# Patient Record
Sex: FEMALE | Race: WHITE | NOT HISPANIC OR LATINO | Employment: FULL TIME | URBAN - METROPOLITAN AREA
[De-identification: names, ages, dates, MRNs, and addresses within clinical notes are randomized per-mention and may not be internally consistent; named-entity substitution may affect disease eponyms.]

---

## 2017-04-18 ENCOUNTER — OFFICE VISIT (OUTPATIENT)
Dept: FAMILY MEDICINE | Facility: CLINIC | Age: 57
End: 2017-04-18
Payer: COMMERCIAL

## 2017-04-18 VITALS — SYSTOLIC BLOOD PRESSURE: 128 MMHG | DIASTOLIC BLOOD PRESSURE: 84 MMHG

## 2017-04-18 DIAGNOSIS — Z00.00 ROUTINE HEALTH MAINTENANCE: Primary | ICD-10-CM

## 2017-04-18 DIAGNOSIS — Z23 NEED FOR DIPHTHERIA-TETANUS-PERTUSSIS (TDAP) VACCINE: ICD-10-CM

## 2017-04-18 DIAGNOSIS — Z23 NEED FOR TDAP VACCINATION: ICD-10-CM

## 2017-04-18 PROCEDURE — 99396 PREV VISIT EST AGE 40-64: CPT | Mod: S$GLB,,, | Performed by: FAMILY MEDICINE

## 2017-04-18 PROCEDURE — 3079F DIAST BP 80-89 MM HG: CPT | Mod: S$GLB,,, | Performed by: FAMILY MEDICINE

## 2017-04-18 PROCEDURE — 3074F SYST BP LT 130 MM HG: CPT | Mod: S$GLB,,, | Performed by: FAMILY MEDICINE

## 2017-04-18 PROCEDURE — 90471 IMMUNIZATION ADMIN: CPT | Mod: S$GLB,,, | Performed by: FAMILY MEDICINE

## 2017-04-18 PROCEDURE — 90715 TDAP VACCINE 7 YRS/> IM: CPT | Mod: S$GLB,,, | Performed by: FAMILY MEDICINE

## 2017-04-18 RX ORDER — MULTIVITAMIN
1 TABLET ORAL DAILY
COMMUNITY

## 2017-04-18 RX ORDER — METHOCARBAMOL 750 MG/1
750 TABLET, FILM COATED ORAL 3 TIMES DAILY
COMMUNITY
Start: 2017-03-07

## 2017-04-18 RX ORDER — ALPRAZOLAM 0.5 MG/1
0.5 TABLET ORAL DAILY PRN
Qty: 30 TABLET | Refills: 1 | Status: SHIPPED | OUTPATIENT
Start: 2017-04-18 | End: 2018-08-14 | Stop reason: SDUPTHER

## 2017-04-25 LAB
ALBUMIN SERPL-MCNC: 4.4 G/DL (ref 3.6–5.1)
ALBUMIN/GLOB SERPL: 1.8 (CALC) (ref 1–2.5)
ALP SERPL-CCNC: 92 U/L (ref 33–130)
ALT SERPL-CCNC: 25 U/L (ref 6–29)
AST SERPL-CCNC: 18 U/L (ref 10–35)
BILIRUB SERPL-MCNC: 0.7 MG/DL (ref 0.2–1.2)
BUN SERPL-MCNC: 17 MG/DL (ref 7–25)
BUN/CREAT SERPL: ABNORMAL (CALC) (ref 6–22)
CALCIUM SERPL-MCNC: 9.5 MG/DL (ref 8.6–10.4)
CHLORIDE SERPL-SCNC: 100 MMOL/L (ref 98–110)
CHOLEST SERPL-MCNC: 253 MG/DL (ref 125–200)
CHOLEST/HDLC SERPL: 4.1 (CALC)
CO2 SERPL-SCNC: 27 MMOL/L (ref 20–31)
CREAT SERPL-MCNC: 0.61 MG/DL (ref 0.5–1.05)
GFR SERPL CREATININE-BSD FRML MDRD: 103 ML/MIN/1.73M2
GLOBULIN SER CALC-MCNC: 2.5 G/DL (CALC) (ref 1.9–3.7)
GLUCOSE SERPL-MCNC: 114 MG/DL (ref 65–99)
HDLC SERPL-MCNC: 62 MG/DL
LDLC SERPL CALC-MCNC: 136 MG/DL (CALC)
NONHDLC SERPL-MCNC: 191 MG/DL (CALC)
POTASSIUM SERPL-SCNC: 4 MMOL/L (ref 3.5–5.3)
PROT SERPL-MCNC: 6.9 G/DL (ref 6.1–8.1)
SODIUM SERPL-SCNC: 138 MMOL/L (ref 135–146)
TRIGL SERPL-MCNC: 277 MG/DL

## 2017-04-26 ENCOUNTER — TELEPHONE (OUTPATIENT)
Dept: FAMILY MEDICINE | Facility: CLINIC | Age: 57
End: 2017-04-26

## 2017-04-26 DIAGNOSIS — R73.9 HYPERGLYCEMIA: ICD-10-CM

## 2017-04-26 DIAGNOSIS — E78.2 ELEVATED TRIGLYCERIDES WITH HIGH CHOLESTEROL: Primary | ICD-10-CM

## 2017-04-28 NOTE — PROGRESS NOTES
Patient ID: Gabby Dill is a 56 y.o. female.    Chief Complaint: Follow-up (annual checkup) and Cyst    HPI      Gabby Dill is a 56 y.o. female. here for annual exam.   No current complaints except cyst on labial area which she is going to see GYN.  When we discussed what the lesion most likely is ( bartholin cyst)  Would rather GYN take care of it.  Htn stable  Occ muscle ache spasm  Anxiety occ use xanax.          Review of Symptoms    Constitutional: Negative.    HENT: Negative.    Eyes: Negative.    Respiratory: Negative.    Cardiovascular: Negative.    Gastrointestinal: Negative.    Endocrine: Negative.    Genitourinary: Negative.    Musculoskeletal: Negative.    Skin: Negative.    Allergic/Immunologic: Negative.    Neurological: Negative.    Hematological: Negative.    Psychiatric/Behavioral: Negative.      Except as above in HPI        Physical  Exam    Constitutional:  Oriented to person, place, and time. Appears well-developed and well-nourished.     HENT:   Head: Normocephalic and atraumatic.     Right Ear: Tympanic membrane, external ear and ear canal normal.     Left Ear: Tympanic membrane, external ear and ear canal normal.     Nose: Nose normal. No rhinorrhea or nasal deformity.     Mouth/Throat: Uvula is midline, oropharynx is clear and moist and mucous membranes are normal.       Eyes: Conjunctivae are normal. Right eye exhibits no discharge. Left eye exhibits no discharge. No scleral icterus.     Neck:  No JVD present. No tracheal deviation  [x]  Neck supple.   []  No Carotid bruit    Cardiovascular: Normal rate, regular rhythm and normal heart sounds.      Pulmonary/Chest: Effort normal and breath sounds normal. No stridor. No respiratory distress. No wheezes. No rales.      Musculoskeletal: Normal range of motion. No edema or tenderness.   No deformity     Lymphadenopathy:  No cervical adenopathy.     Neurological:  Alert and oriented to person, place, and time. Coordination  normal.     Skin: Skin is warm and dry. No rash noted.     Psychiatric: Normal mood and affect. Speech is normal and behavior is normal. Judgment and thought content normal.       Assessment / Plan:      ICD-10-CM ICD-9-CM   1. Routine health maintenance Z00.00 V70.0   2. Need for diphtheria-tetanus-pertussis (Tdap) vaccine Z23 V06.1   3. Need for Tdap vaccination Z23 V06.1     Routine health maintenance  -     Comprehensive metabolic panel; Future  -     Lipid panel; Future    Need for diphtheria-tetanus-pertussis (Tdap) vaccine  -     Tdap Vaccine    Need for Tdap vaccination  -     Cancel: Tdap Vaccine    Other orders  -     alprazolam (XANAX) 0.5 MG tablet; Take 1 tablet (0.5 mg total) by mouth daily as needed for Anxiety.  Dispense: 30 tablet; Refill: 1    Discussed how to stay healthy including: diet, exercise, refraining from smoking and discussed screening exams / tests needed for age, sex and family Hx.

## 2017-07-10 RX ORDER — LISINOPRIL AND HYDROCHLOROTHIAZIDE 12.5; 2 MG/1; MG/1
1 TABLET ORAL DAILY
Qty: 90 TABLET | Refills: 3 | Status: SHIPPED | OUTPATIENT
Start: 2017-07-10 | End: 2018-04-17 | Stop reason: SDUPTHER

## 2017-07-10 NOTE — TELEPHONE ENCOUNTER
Please send to [facility]. Last OV 04/18/2017    lisinopril-hydrochlorothiazide (PRINZIDE,ZESTORETIC) 20-12.5 mg per tablet  Take 1 tablet by mouth once daily.   Normal, Disp-90 tablet, R-3

## 2017-08-02 DIAGNOSIS — Z12.31 SCREENING MAMMOGRAM, ENCOUNTER FOR: Primary | ICD-10-CM

## 2017-10-24 ENCOUNTER — HOSPITAL ENCOUNTER (OUTPATIENT)
Dept: RADIOLOGY | Facility: HOSPITAL | Age: 57
Discharge: HOME | End: 2017-10-24
Attending: OBSTETRICS & GYNECOLOGY
Payer: COMMERCIAL

## 2017-10-24 VITALS — HEIGHT: 64 IN | BODY MASS INDEX: 30.9 KG/M2 | WEIGHT: 181 LBS

## 2017-10-24 DIAGNOSIS — Z12.31 SCREENING MAMMOGRAM, ENCOUNTER FOR: ICD-10-CM

## 2017-10-24 PROCEDURE — 77067 SCR MAMMO BI INCL CAD: CPT | Mod: TC

## 2017-11-13 ENCOUNTER — TELEPHONE (OUTPATIENT)
Dept: FAMILY MEDICINE | Facility: CLINIC | Age: 57
End: 2017-11-13

## 2017-11-13 RX ORDER — CEPHALEXIN 500 MG/1
1000 CAPSULE ORAL 2 TIMES DAILY
Qty: 28 CAPSULE | Refills: 0 | Status: SHIPPED | OUTPATIENT
Start: 2017-11-13 | End: 2018-08-14

## 2017-11-13 NOTE — TELEPHONE ENCOUNTER
I left message with details for the pt that [facility] and advised warm compresses and advised to return call with any questions

## 2017-11-13 NOTE — TELEPHONE ENCOUNTER
----- Message from Elida Combs sent at 11/13/2017  9:52 AM CST -----  Patient has a pimple buttock next to anus. Patient used warm compress & it bust. Doesn't have a head to hit. She is back to regular return. Swelling has gone down considerably since Saturday. Patient is requesting an antibiotic just as a precaution. (allergic to inseds)     [facility]

## 2018-04-17 RX ORDER — LISINOPRIL AND HYDROCHLOROTHIAZIDE 12.5; 2 MG/1; MG/1
1 TABLET ORAL DAILY
Qty: 90 TABLET | Refills: 0 | Status: SHIPPED | OUTPATIENT
Start: 2018-04-17 | End: 2018-09-02 | Stop reason: SDUPTHER

## 2018-04-17 NOTE — TELEPHONE ENCOUNTER
Please send to [facility]    Remove  lisinopril-hydrochlorothiazide (PRINZIDE,ZESTORETIC) 20-12.5 mg per tablet  Take 1 tablet by mouth once daily.   Normal, Disp-90 tablet, R-3

## 2018-08-09 ENCOUNTER — TELEPHONE (OUTPATIENT)
Dept: FAMILY MEDICINE | Facility: CLINIC | Age: 58
End: 2018-08-09

## 2018-08-09 DIAGNOSIS — E78.2 ELEVATED TRIGLYCERIDES WITH HIGH CHOLESTEROL: ICD-10-CM

## 2018-08-09 DIAGNOSIS — Z00.00 ROUTINE HEALTH MAINTENANCE: Primary | ICD-10-CM

## 2018-08-09 DIAGNOSIS — K21.00 REFLUX ESOPHAGITIS: ICD-10-CM

## 2018-08-09 DIAGNOSIS — R73.9 HYPERGLYCEMIA: ICD-10-CM

## 2018-08-09 DIAGNOSIS — I10 ESSENTIAL HYPERTENSION: ICD-10-CM

## 2018-08-09 NOTE — TELEPHONE ENCOUNTER
----- Message from Yessenia Banuelos sent at 8/9/2018  9:12 AM CDT -----  Contact: [phone]/self  Patient is calling to find out if she and her  needs to have blood work done before their appt on 8/14/18. Please call and advise.    Dr Galdamez - please review pt labs - she has orders already bmp, a1c, lipid  To be drawn at [facility]  But do you want any other labs drawn  ( if so we need to cancel the already ordered test and order them all together that way the original orders are not missed )

## 2018-08-09 NOTE — TELEPHONE ENCOUNTER
----- Message from Che Oleary sent at 8/9/2018  3:45 PM CDT -----  Contact: self / [phone]  Attn: Mel Farr & Dolly Balderas         Patient is requesting a call back regarding, she needs lab orders. Please advise

## 2018-08-11 LAB
ALBUMIN SERPL-MCNC: 4.3 G/DL (ref 3.6–5.1)
ALBUMIN/GLOB SERPL: 1.7 (CALC) (ref 1–2.5)
ALP SERPL-CCNC: 43 U/L (ref 33–130)
ALT SERPL-CCNC: 16 U/L (ref 6–29)
AST SERPL-CCNC: 16 U/L (ref 10–35)
BILIRUB DIRECT SERPL-MCNC: 0.1 MG/DL
BILIRUB INDIRECT SERPL-MCNC: 0.6 MG/DL (CALC) (ref 0.2–1.2)
BILIRUB SERPL-MCNC: 0.7 MG/DL (ref 0.2–1.2)
GLOBULIN SER CALC-MCNC: 2.6 G/DL (CALC) (ref 1.9–3.7)
PROT SERPL-MCNC: 6.9 G/DL (ref 6.1–8.1)
TSH SERPL-ACNC: 1.71 MIU/L (ref 0.4–4.5)

## 2018-08-14 ENCOUNTER — OFFICE VISIT (OUTPATIENT)
Dept: FAMILY MEDICINE | Facility: CLINIC | Age: 58
End: 2018-08-14
Payer: COMMERCIAL

## 2018-08-14 VITALS
HEIGHT: 64 IN | HEART RATE: 99 BPM | SYSTOLIC BLOOD PRESSURE: 128 MMHG | BODY MASS INDEX: 30.56 KG/M2 | TEMPERATURE: 99 F | WEIGHT: 179 LBS | DIASTOLIC BLOOD PRESSURE: 78 MMHG | OXYGEN SATURATION: 98 %

## 2018-08-14 DIAGNOSIS — Z00.00 ROUTINE HEALTH MAINTENANCE: Primary | ICD-10-CM

## 2018-08-14 DIAGNOSIS — E78.2 ELEVATED TRIGLYCERIDES WITH HIGH CHOLESTEROL: ICD-10-CM

## 2018-08-14 DIAGNOSIS — R73.9 HYPERGLYCEMIA: ICD-10-CM

## 2018-08-14 LAB
BUN SERPL-MCNC: 20 MG/DL (ref 7–25)
BUN/CREAT SERPL: ABNORMAL (CALC) (ref 6–22)
CALCIUM SERPL-MCNC: 9.5 MG/DL (ref 8.6–10.4)
CHLORIDE SERPL-SCNC: 102 MMOL/L (ref 98–110)
CHOLEST SERPL-MCNC: 240 MG/DL
CHOLEST/HDLC SERPL: 3.3 (CALC)
CO2 SERPL-SCNC: 28 MMOL/L (ref 20–32)
CREAT SERPL-MCNC: 0.68 MG/DL (ref 0.5–1.05)
GFR SERPL CREATININE-BSD FRML MDRD: 98 ML/MIN/1.73M2
GLUCOSE SERPL-MCNC: 130 MG/DL (ref 65–99)
HBA1C MFR BLD: 5.7 % OF TOTAL HGB
HDLC SERPL-MCNC: 73 MG/DL
LDLC SERPL CALC-MCNC: 127 MG/DL (CALC)
NONHDLC SERPL-MCNC: 167 MG/DL (CALC)
POTASSIUM SERPL-SCNC: 4.4 MMOL/L (ref 3.5–5.3)
SODIUM SERPL-SCNC: 140 MMOL/L (ref 135–146)
TRIGL SERPL-MCNC: 258 MG/DL

## 2018-08-14 PROCEDURE — 3078F DIAST BP <80 MM HG: CPT | Mod: CPTII,S$GLB,, | Performed by: FAMILY MEDICINE

## 2018-08-14 PROCEDURE — 99396 PREV VISIT EST AGE 40-64: CPT | Mod: S$GLB,,, | Performed by: FAMILY MEDICINE

## 2018-08-14 PROCEDURE — 3074F SYST BP LT 130 MM HG: CPT | Mod: CPTII,S$GLB,, | Performed by: FAMILY MEDICINE

## 2018-08-14 RX ORDER — ESTRADIOL 1 MG/1
1 TABLET ORAL DAILY
COMMUNITY
Start: 2018-06-23

## 2018-08-14 RX ORDER — PROGESTERONE 100 MG/1
1 CAPSULE ORAL DAILY
COMMUNITY
Start: 2018-06-26

## 2018-08-14 RX ORDER — ALPRAZOLAM 0.5 MG/1
0.5 TABLET ORAL DAILY PRN
Qty: 30 TABLET | Refills: 1 | Status: SHIPPED | OUTPATIENT
Start: 2018-08-14 | End: 2019-02-26 | Stop reason: SDUPTHER

## 2018-08-14 NOTE — PROGRESS NOTES
" Patient ID: Gabby Dill is a 58 y.o. female.    Chief Complaint: Annual Exam    HPI      Gabby Dill is a 58 y.o. female. here for annual exam.   No current complaints except some mild nasal congestion and postnasal drip.  Also itchy watery eyes and sneezing.        Review of Symptoms    Constitutional: Negative.    HENT: Negative.    Eyes: Negative.    Respiratory: Negative.    Cardiovascular: Negative.    Gastrointestinal: Negative.    Endocrine: Negative.    Genitourinary: Negative.    Musculoskeletal: Negative.    Skin: Negative.    Allergic/Immunologic: Negative.    Neurological: Negative.    Hematological: Negative.    Psychiatric/Behavioral: Negative.      Except as above in HPI      /78   Pulse 99   Temp 98.5 °F (36.9 °C)   Ht 5' 4" (1.626 m)   Wt 81.2 kg (179 lb)   SpO2 98%   BMI 30.73 kg/m²     Physical  Exam    Constitutional:  Oriented to person, place, and time. Appears well-developed and well-nourished.     HENT:   Head: Normocephalic and atraumatic.     Right Ear: Tympanic membrane, ear canal and External ear normal     Left Ear: Tympanic membrane, ear canal and External ear normal     Nose: Nose normal.  Minimal rhinorrhea and no nasal deformity - right nares- slight edema of turbinates.     Mouth/Throat: Uvula is midline, oropharynx is clear and moist and mucous membranes are normal.      Eyes: Conjunctivae are normal. Right eye exhibits no discharge. Left eye exhibits no discharge. No scleral icterus.     Neck:  No JVD present. No tracheal deviation  [x]  Neck supple.   [x]  No Carotid bruit    Cardiovascular: Normal rate, regular rhythm and normal heart sounds.      Pulmonary/Chest: Effort normal and breath sounds normal. No stridor. No respiratory distress. No wheezes. No rales.      Musculoskeletal: Normal range of motion. No edema or tenderness.   No deformity     Lymphadenopathy:  No cervical adenopathy.     Neurological:  Alert and oriented to person, place, and " time. Coordination normal.     Skin: Skin is warm and dry. No rash noted.     Psychiatric: Normal mood and affect. Speech is normal and behavior is normal. Judgment and thought content normal.     Complete Blood Count  Lab Results   Component Value Date    RBC 5.06 11/08/2016    HGB 14.2 11/08/2016    HCT 42.7 11/08/2016    MCV 84.4 11/08/2016    MCH 28.1 11/08/2016    MCHC 33.3 11/08/2016    RDW 14.1 11/08/2016     11/08/2016    MPV 8.0 11/08/2016    LYMPH 2,323 11/08/2016    LYMPH 36.3 11/08/2016    MONO 653 11/08/2016    MONO 10.2 11/08/2016     11/08/2016    BASO 19 11/08/2016    EOSINOPHIL 2.0 11/08/2016    BASOPHIL 0.3 11/08/2016       Comprehensive Metabolic Panel  Lab Results   Component Value Date    PROT 6.9 08/10/2018    ALBUMIN 4.3 08/10/2018    BILITOT 0.7 08/10/2018    AST 16 08/10/2018    ALKPHOS 43 08/10/2018    ALT 16 08/10/2018       TSH  Lab Results   Component Value Date    TSH 1.71 08/10/2018       Assessment / Plan:      ICD-10-CM ICD-9-CM   1. Routine health maintenance Z00.00 V70.0   2. Elevated triglycerides with high cholesterol E78.2 272.2   3. Hyperglycemia R73.9 790.29     Routine health maintenance  -     Basic metabolic panel; Future; Expected date: 08/14/2018  -     Lipid panel; Future  -     Hemoglobin A1c; Future    Elevated triglycerides with high cholesterol  -     Basic metabolic panel; Future; Expected date: 08/14/2018  -     Lipid panel; Future  -     Hemoglobin A1c; Future    Hyperglycemia  -     Basic metabolic panel; Future; Expected date: 08/14/2018  -     Lipid panel; Future  -     Hemoglobin A1c; Future    Other orders  -     ALPRAZolam (XANAX) 0.5 MG tablet; Take 1 tablet (0.5 mg total) by mouth daily as needed for Anxiety.  Dispense: 30 tablet; Refill: 1      Discussed lab work-other lab work basic metabolic panel, hemoglobin A1c, lipid panel not done    Discussed how to stay healthy including: diet, exercise, refraining from smoking and discussed  screening exams / tests needed for age, sex and family Hx.

## 2018-09-03 RX ORDER — LISINOPRIL AND HYDROCHLOROTHIAZIDE 12.5; 2 MG/1; MG/1
TABLET ORAL
Qty: 90 TABLET | Refills: 0 | Status: SHIPPED | OUTPATIENT
Start: 2018-09-03 | End: 2019-01-01 | Stop reason: SDUPTHER

## 2018-09-05 ENCOUNTER — TELEPHONE (OUTPATIENT)
Dept: FAMILY MEDICINE | Facility: CLINIC | Age: 58
End: 2018-09-05

## 2018-09-05 DIAGNOSIS — Z12.31 ENCOUNTER FOR SCREENING MAMMOGRAM FOR BREAST CANCER: ICD-10-CM

## 2018-09-05 DIAGNOSIS — Z12.39 SCREENING FOR BREAST CANCER: Primary | ICD-10-CM

## 2018-09-05 NOTE — TELEPHONE ENCOUNTER
----- Message from Imani Morris sent at 9/5/2018  9:40 AM CDT -----  Contact: [phone]/self  Patient requesting orders for a mammogram. Thanks        Not due until after 10/24

## 2018-11-16 ENCOUNTER — HOSPITAL ENCOUNTER (OUTPATIENT)
Dept: RADIOLOGY | Facility: HOSPITAL | Age: 58
Discharge: HOME | End: 2018-11-16
Attending: FAMILY MEDICINE
Payer: COMMERCIAL

## 2018-11-16 VITALS — BODY MASS INDEX: 30.56 KG/M2 | HEIGHT: 64 IN | WEIGHT: 179 LBS

## 2018-11-16 DIAGNOSIS — Z12.31 ENCOUNTER FOR SCREENING MAMMOGRAM FOR BREAST CANCER: ICD-10-CM

## 2018-11-16 DIAGNOSIS — Z12.39 SCREENING FOR BREAST CANCER: ICD-10-CM

## 2018-11-16 PROCEDURE — 77063 BREAST TOMOSYNTHESIS BI: CPT | Mod: TC,PO

## 2018-11-16 PROCEDURE — 77067 SCR MAMMO BI INCL CAD: CPT | Mod: TC,PO

## 2019-01-02 RX ORDER — LISINOPRIL AND HYDROCHLOROTHIAZIDE 12.5; 2 MG/1; MG/1
TABLET ORAL
Qty: 90 TABLET | Refills: 0 | Status: SHIPPED | OUTPATIENT
Start: 2019-01-02 | End: 2019-04-02 | Stop reason: SDUPTHER

## 2019-01-07 ENCOUNTER — TELEPHONE (OUTPATIENT)
Dept: FAMILY MEDICINE | Facility: CLINIC | Age: 59
End: 2019-01-07

## 2019-01-07 NOTE — TELEPHONE ENCOUNTER
----- Message from Imani Morris sent at 1/7/2019  3:19 PM CST -----  Contact: [phone]/self  Patient is requesting a call about test results. Dr. Benito should have faxed her results showing kidney stone. He suggest she follow up with PCP. Thanks    Did you receive this report? AR

## 2019-01-08 NOTE — TELEPHONE ENCOUNTER
I notified the pt - we have not received the report  I called dr geo [facility], [place] - she will fax report and then I will forward this to dr henriquez to review once I receive the report

## 2019-01-08 NOTE — TELEPHONE ENCOUNTER
The report shows a small stone in the collecting area of the kidney.  This should cause no problems.  The there is nothing to do except observe

## 2019-01-09 NOTE — TELEPHONE ENCOUNTER
That stone is NOT NOT the problem lots of people have them.  Most are from calcium but we have no way to do analysis on  it.  I would just stay well hydrated.

## 2019-01-09 NOTE — TELEPHONE ENCOUNTER
I spoke with the pt - she said she had the CT scan was bc she was having pain/discomfort in that particular area  She thought it was diverticulitis again  She is not bend over in pain but tender and sore discomfort      She had a Colonoscopy at end of nov 2018  Dr Benito said her colitis looked good  He advised her to   Stop Norco and he started  Her taking alleve once daily for the arthritis pain    The main concern is - should you afford anything that would cause her to have this stone grow    I suggested she come in and maybe discuss with you - she just wants your opinion - unfortunately can you call her to reassure her that all is good?

## 2019-01-09 NOTE — TELEPHONE ENCOUNTER
Patient advised of results. Patient would like to know if there's any foods or other things she should be avoiding to not aggravate the issue. Because she initially went to see her gastro doctor due to pain; she's fine right now but doesn't want to do anything that would cause it to be unbearable.

## 2019-02-26 RX ORDER — ALPRAZOLAM 0.5 MG/1
TABLET ORAL
Qty: 30 TABLET | Refills: 1 | Status: SHIPPED | OUTPATIENT
Start: 2019-02-26 | End: 2019-10-29 | Stop reason: SDUPTHER

## 2019-04-02 RX ORDER — LISINOPRIL AND HYDROCHLOROTHIAZIDE 12.5; 2 MG/1; MG/1
TABLET ORAL
Qty: 90 TABLET | Refills: 0 | Status: SHIPPED | OUTPATIENT
Start: 2019-04-02 | End: 2019-07-07 | Stop reason: SDUPTHER

## 2019-07-07 RX ORDER — LISINOPRIL AND HYDROCHLOROTHIAZIDE 12.5; 2 MG/1; MG/1
TABLET ORAL
Qty: 90 TABLET | Refills: 0 | Status: SHIPPED | OUTPATIENT
Start: 2019-07-07 | End: 2019-10-08 | Stop reason: SDUPTHER

## 2019-09-30 ENCOUNTER — HOSPITAL ENCOUNTER (OUTPATIENT)
Facility: HOSPITAL | Age: 59
Discharge: HOME | End: 2019-10-01
Attending: EMERGENCY MEDICINE | Admitting: SURGERY
Payer: COMMERCIAL

## 2019-09-30 ENCOUNTER — ANESTHESIA EVENT (OUTPATIENT)
Dept: SURGERY | Facility: HOSPITAL | Age: 59
End: 2019-09-30
Payer: COMMERCIAL

## 2019-09-30 DIAGNOSIS — K35.30 ACUTE APPENDICITIS WITH LOCALIZED PERITONITIS, WITHOUT PERFORATION, ABSCESS, OR GANGRENE: Primary | ICD-10-CM

## 2019-09-30 LAB
ALBUMIN SERPL BCP-MCNC: 4 G/DL (ref 3.5–5.2)
ALP SERPL-CCNC: 49 U/L (ref 38–126)
ALT SERPL W/O P-5'-P-CCNC: 22 U/L (ref 10–44)
ANION GAP SERPL CALC-SCNC: 9 MMOL/L (ref 8–16)
AST SERPL-CCNC: 25 U/L (ref 15–46)
BASOPHILS # BLD AUTO: 0.01 K/UL (ref 0–0.2)
BASOPHILS NFR BLD: 0.1 % (ref 0–1.9)
BILIRUB SERPL-MCNC: 0.8 MG/DL (ref 0.1–1)
BILIRUB UR QL STRIP: NEGATIVE
BUN SERPL-MCNC: 26 MG/DL (ref 7–17)
CALCIUM SERPL-MCNC: 9 MG/DL (ref 8.7–10.5)
CHLORIDE SERPL-SCNC: 107 MMOL/L (ref 95–110)
CLARITY UR REFRACT.AUTO: CLEAR
CO2 SERPL-SCNC: 23 MMOL/L (ref 23–29)
COLOR UR AUTO: ABNORMAL
CREAT SERPL-MCNC: 0.53 MG/DL (ref 0.5–1.4)
DIFFERENTIAL METHOD: ABNORMAL
EOSINOPHIL # BLD AUTO: 0.1 K/UL (ref 0–0.5)
EOSINOPHIL NFR BLD: 0.7 % (ref 0–8)
ERYTHROCYTE [DISTWIDTH] IN BLOOD BY AUTOMATED COUNT: 12.9 % (ref 11.5–14.5)
EST. GFR  (AFRICAN AMERICAN): >60 ML/MIN/1.73 M^2
EST. GFR  (NON AFRICAN AMERICAN): >60 ML/MIN/1.73 M^2
GLUCOSE SERPL-MCNC: 129 MG/DL (ref 70–110)
GLUCOSE UR QL STRIP: NEGATIVE
HCT VFR BLD AUTO: 42.6 % (ref 37–48.5)
HGB BLD-MCNC: 14.3 G/DL (ref 12–16)
HGB UR QL STRIP: ABNORMAL
KETONES UR QL STRIP: NEGATIVE
LEUKOCYTE ESTERASE UR QL STRIP: NEGATIVE
LIPASE SERPL-CCNC: 40 U/L (ref 23–300)
LYMPHOCYTES # BLD AUTO: 1 K/UL (ref 1–4.8)
LYMPHOCYTES NFR BLD: 14 % (ref 18–48)
MCH RBC QN AUTO: 29.7 PG (ref 27–31)
MCHC RBC AUTO-ENTMCNC: 33.6 G/DL (ref 32–36)
MCV RBC AUTO: 89 FL (ref 82–98)
MONOCYTES # BLD AUTO: 0.1 K/UL (ref 0.3–1)
MONOCYTES NFR BLD: 1.3 % (ref 4–15)
NEUTROPHILS # BLD AUTO: 5.7 K/UL (ref 1.8–7.7)
NEUTROPHILS NFR BLD: 83.9 % (ref 38–73)
NITRITE UR QL STRIP: NEGATIVE
PH UR STRIP: 5 [PH] (ref 5–8)
PLATELET # BLD AUTO: 242 K/UL (ref 150–350)
PMV BLD AUTO: 9.8 FL (ref 9.2–12.9)
POTASSIUM SERPL-SCNC: 3.7 MMOL/L (ref 3.5–5.1)
PROT SERPL-MCNC: 6.7 G/DL (ref 6–8.4)
PROT UR QL STRIP: NEGATIVE
RBC # BLD AUTO: 4.81 M/UL (ref 4–5.4)
SODIUM SERPL-SCNC: 139 MMOL/L (ref 136–145)
SP GR UR STRIP: 1.01 (ref 1–1.03)
URN SPEC COLLECT METH UR: ABNORMAL
UROBILINOGEN UR STRIP-ACNC: NEGATIVE EU/DL
WBC # BLD AUTO: 6.86 K/UL (ref 3.9–12.7)

## 2019-09-30 PROCEDURE — G0378 HOSPITAL OBSERVATION PER HR: HCPCS

## 2019-09-30 PROCEDURE — 81003 URINALYSIS AUTO W/O SCOPE: CPT | Mod: ER

## 2019-09-30 PROCEDURE — 83690 ASSAY OF LIPASE: CPT | Mod: ER

## 2019-09-30 PROCEDURE — 96375 TX/PRO/DX INJ NEW DRUG ADDON: CPT | Mod: ER

## 2019-09-30 PROCEDURE — 85025 COMPLETE CBC W/AUTO DIFF WBC: CPT | Mod: ER

## 2019-09-30 PROCEDURE — 25000003 PHARM REV CODE 250: Performed by: SURGERY

## 2019-09-30 PROCEDURE — 80053 COMPREHEN METABOLIC PANEL: CPT | Mod: ER

## 2019-09-30 PROCEDURE — 96375 TX/PRO/DX INJ NEW DRUG ADDON: CPT

## 2019-09-30 PROCEDURE — 96365 THER/PROPH/DIAG IV INF INIT: CPT

## 2019-09-30 PROCEDURE — 63600175 PHARM REV CODE 636 W HCPCS: Performed by: SURGERY

## 2019-09-30 PROCEDURE — 63600175 PHARM REV CODE 636 W HCPCS: Mod: ER | Performed by: EMERGENCY MEDICINE

## 2019-09-30 PROCEDURE — 96361 HYDRATE IV INFUSION ADD-ON: CPT | Mod: ER

## 2019-09-30 PROCEDURE — 25000003 PHARM REV CODE 250: Performed by: STUDENT IN AN ORGANIZED HEALTH CARE EDUCATION/TRAINING PROGRAM

## 2019-09-30 PROCEDURE — 99285 EMERGENCY DEPT VISIT HI MDM: CPT | Mod: 25,ER

## 2019-09-30 PROCEDURE — 96361 HYDRATE IV INFUSION ADD-ON: CPT

## 2019-09-30 RX ORDER — OXYCODONE HYDROCHLORIDE 5 MG/1
5 TABLET ORAL EVERY 4 HOURS PRN
Status: DISCONTINUED | OUTPATIENT
Start: 2019-09-30 | End: 2019-10-01 | Stop reason: HOSPADM

## 2019-09-30 RX ORDER — KETOROLAC TROMETHAMINE 30 MG/ML
15 INJECTION, SOLUTION INTRAMUSCULAR; INTRAVENOUS EVERY 6 HOURS
Status: DISCONTINUED | OUTPATIENT
Start: 2019-09-30 | End: 2019-09-30

## 2019-09-30 RX ORDER — ACETAMINOPHEN 325 MG/1
650 TABLET ORAL EVERY 4 HOURS PRN
Status: DISCONTINUED | OUTPATIENT
Start: 2019-09-30 | End: 2019-10-01 | Stop reason: HOSPADM

## 2019-09-30 RX ORDER — SODIUM CHLORIDE, SODIUM LACTATE, POTASSIUM CHLORIDE, CALCIUM CHLORIDE 600; 310; 30; 20 MG/100ML; MG/100ML; MG/100ML; MG/100ML
INJECTION, SOLUTION INTRAVENOUS CONTINUOUS
Status: DISCONTINUED | OUTPATIENT
Start: 2019-09-30 | End: 2019-10-01 | Stop reason: HOSPADM

## 2019-09-30 RX ORDER — SODIUM CHLORIDE 0.9 % (FLUSH) 0.9 %
10 SYRINGE (ML) INJECTION
Status: DISCONTINUED | OUTPATIENT
Start: 2019-09-30 | End: 2019-09-30

## 2019-09-30 RX ORDER — SODIUM CHLORIDE 0.9 % (FLUSH) 0.9 %
10 SYRINGE (ML) INJECTION
Status: DISCONTINUED | OUTPATIENT
Start: 2019-09-30 | End: 2019-10-01 | Stop reason: HOSPADM

## 2019-09-30 RX ORDER — ONDANSETRON 8 MG/1
8 TABLET, ORALLY DISINTEGRATING ORAL EVERY 8 HOURS PRN
Status: DISCONTINUED | OUTPATIENT
Start: 2019-09-30 | End: 2019-10-01 | Stop reason: HOSPADM

## 2019-09-30 RX ORDER — BUTALBITAL, ACETAMINOPHEN AND CAFFEINE 50; 325; 40 MG/1; MG/1; MG/1
1 TABLET ORAL ONCE
Status: COMPLETED | OUTPATIENT
Start: 2019-09-30 | End: 2019-09-30

## 2019-09-30 RX ORDER — ACETAMINOPHEN 325 MG/1
650 TABLET ORAL EVERY 8 HOURS PRN
Status: DISCONTINUED | OUTPATIENT
Start: 2019-09-30 | End: 2019-10-01 | Stop reason: HOSPADM

## 2019-09-30 RX ORDER — ALPRAZOLAM 0.25 MG/1
0.5 TABLET ORAL NIGHTLY PRN
Status: DISCONTINUED | OUTPATIENT
Start: 2019-09-30 | End: 2019-10-01 | Stop reason: HOSPADM

## 2019-09-30 RX ORDER — ONDANSETRON 2 MG/ML
4 INJECTION INTRAMUSCULAR; INTRAVENOUS
Status: COMPLETED | OUTPATIENT
Start: 2019-09-30 | End: 2019-09-30

## 2019-09-30 RX ADMIN — OXYCODONE HYDROCHLORIDE 5 MG: 5 TABLET ORAL at 09:09

## 2019-09-30 RX ADMIN — SODIUM CHLORIDE, SODIUM LACTATE, POTASSIUM CHLORIDE, AND CALCIUM CHLORIDE: .6; .31; .03; .02 INJECTION, SOLUTION INTRAVENOUS at 06:09

## 2019-09-30 RX ADMIN — ACETAMINOPHEN 650 MG: 325 TABLET ORAL at 09:09

## 2019-09-30 RX ADMIN — OXYCODONE HYDROCHLORIDE 5 MG: 5 TABLET ORAL at 04:09

## 2019-09-30 RX ADMIN — PIPERACILLIN AND TAZOBACTAM 4.5 G: 4; .5 INJECTION, POWDER, LYOPHILIZED, FOR SOLUTION INTRAVENOUS; PARENTERAL at 08:09

## 2019-09-30 RX ADMIN — SODIUM CHLORIDE, SODIUM LACTATE, POTASSIUM CHLORIDE, AND CALCIUM CHLORIDE: .6; .31; .03; .02 INJECTION, SOLUTION INTRAVENOUS at 09:09

## 2019-09-30 RX ADMIN — SODIUM CHLORIDE 1000 ML: 0.9 INJECTION, SOLUTION INTRAVENOUS at 03:09

## 2019-09-30 RX ADMIN — ONDANSETRON 8 MG: 8 TABLET, ORALLY DISINTEGRATING ORAL at 09:09

## 2019-09-30 RX ADMIN — PIPERACILLIN AND TAZOBACTAM 4.5 G: 4; .5 INJECTION, POWDER, LYOPHILIZED, FOR SOLUTION INTRAVENOUS; PARENTERAL at 05:09

## 2019-09-30 RX ADMIN — PIPERACILLIN AND TAZOBACTAM 4.5 G: 4; .5 INJECTION, POWDER, LYOPHILIZED, FOR SOLUTION INTRAVENOUS; PARENTERAL at 12:09

## 2019-09-30 RX ADMIN — BUTALBITAL, ACETAMINOPHEN, AND CAFFEINE 1 TABLET: 50; 325; 40 TABLET ORAL at 12:09

## 2019-09-30 RX ADMIN — ONDANSETRON 4 MG: 2 INJECTION INTRAMUSCULAR; INTRAVENOUS at 03:09

## 2019-09-30 RX ADMIN — ONDANSETRON 8 MG: 8 TABLET, ORALLY DISINTEGRATING ORAL at 06:09

## 2019-09-30 RX ADMIN — PROMETHAZINE HYDROCHLORIDE 12.5 MG: 25 INJECTION INTRAMUSCULAR; INTRAVENOUS at 12:09

## 2019-09-30 NOTE — ED NOTES
Updated pt and family that report has been called and we are just waiting on [facility] to transport. Discussed things pt would need at next hospital. I strongly advised the they bring all pt's medication bottles so that the next facility could perform a more accurate med rec.  We discussed the need for robe, slippers, and loose fitting clothing.   Pt and spouse verbalized understanding.

## 2019-09-30 NOTE — PLAN OF CARE
Patient is sleeping on and off in bed.  Has complaints of pain and pain medications given with relief.  Instructed on pre-op bath, IVF, and nothing by mouth effective midnight, patient verbalized complete understanding.   at bedside.  Safety is maintained with bed low, wheels locked and side rails up.  Call light within reach.  Will continue to monitor.

## 2019-09-30 NOTE — NURSING
Patient is complaining of nausea.  Patient had received Zofran ODT and had mild relief.  Dr. Ruby notified.  Phenergan ordered.  Will give.  Will continue to monitor.

## 2019-09-30 NOTE — HPI
59-year-old female with history of hypertension and ulcerative colitis on lialda and aciphex presents from outside facility with moderate to severe acute onset RLQ abdominal pain described as sharp since 0100 today. Associated with nausea. Pain is not relieved by anything and is constant. Has not had this type of pain before. Denies fevers, chills, vomiting, or any other complaints at this time. Outside CT scan shows concern for acute appendicitis. No previous abdominal surgery. Last flair form ulcerative colitis several years ago.

## 2019-09-30 NOTE — ED PROVIDER NOTES
Encounter Date: 2019       History     Chief Complaint   Patient presents with    Abdominal Pain     Sudden onset non-radiating sharp RLQ abdominal pain two hours ago with nausea 10/10     The history is provided by the patient.   Abdominal Pain   The current episode started 1 to 2 hours ago. The onset of the illness was abrupt. The problem has not changed since onset.The abdominal pain is located in the RLQ. The abdominal pain does not radiate. The severity of the abdominal pain is 10/10. The abdominal pain is relieved by nothing. The abdominal pain is exacerbated by movement. The other symptoms of the illness do not include fever, jaundice, melena, nausea, vomiting, diarrhea, dysuria, hematemesis or hematochezia.   The patient states that she believes she is currently not pregnant. The patient has not had a change in bowel habit. Additional symptoms associated with the illness include anorexia. Symptoms associated with the illness do not include chills, diaphoresis, heartburn, constipation or urgency.     Review of patient's allergies indicates:   Allergen Reactions    Morphine Hives    Nsaids (non-steroidal anti-inflammatory drug) Other (See Comments)     GI bleed    Sulfa (sulfonamide antibiotics) Rash     Past Medical History:   Diagnosis Date    Arthritis     back and tendenosis of shoulder  CTS    Colitis 2011    Colitis     Hypertension      Past Surgical History:   Procedure Laterality Date    FACIAL FRACTURE SURGERY      PLANTAR FASCIA SURGERY Bilateral      Family History   Problem Relation Age of Onset    Hypertension Mother     Cancer Father     Hypertension Father      Social History     Tobacco Use    Smoking status: Former Smoker     Last attempt to quit: 1995     Years since quittin.7   Substance Use Topics    Alcohol use: Yes     Comment: occasionally    Drug use: No     Review of Systems   Constitutional: Negative for chills, diaphoresis and fever.   Gastrointestinal:  Positive for abdominal pain and anorexia. Negative for constipation, diarrhea, heartburn, hematemesis, hematochezia, jaundice, melena, nausea and vomiting.   Genitourinary: Negative for dysuria and urgency.   All other systems reviewed and are negative.      Physical Exam     Initial Vitals [09/30/19 0226]   BP Pulse Resp Temp SpO2   (!) 141/79 94 20 97.6 °F (36.4 °C) 100 %      MAP       --         Physical Exam    Nursing note and vitals reviewed.  Constitutional: She appears well-developed and well-nourished.   HENT:   Head: Normocephalic and atraumatic.   Eyes: Conjunctivae and EOM are normal.   Neck: Normal range of motion. Neck supple.   Cardiovascular: Normal rate, regular rhythm and normal heart sounds.   Pulmonary/Chest: Breath sounds normal. She has no wheezes. She has no rhonchi. She has no rales.   Abdominal: There is tenderness. There is rebound and guarding.   Musculoskeletal: Normal range of motion.   Neurological: She is alert and oriented to person, place, and time. GCS score is 15. GCS eye subscore is 4. GCS verbal subscore is 5. GCS motor subscore is 6.   Skin: Skin is warm and dry. Capillary refill takes less than 2 seconds.   Psychiatric: She has a normal mood and affect. Her behavior is normal. Judgment and thought content normal.         ED Course   Procedures  Labs Reviewed   URINALYSIS, REFLEX TO URINE CULTURE - Abnormal; Notable for the following components:       Result Value    Occult Blood UA Trace (*)     All other components within normal limits    Narrative:     Preferred Collection Type->Urine, Clean Catch   CBC W/ AUTO DIFFERENTIAL - Abnormal; Notable for the following components:    Mono # 0.1 (*)     Gran% 83.9 (*)     Lymph% 14.0 (*)     Mono% 1.3 (*)     All other components within normal limits   COMPREHENSIVE METABOLIC PANEL - Abnormal; Notable for the following components:    Glucose 129 (*)     BUN, Bld 26 (*)     All other components within normal limits   LIPASE           Imaging Results          CT Abdomen Pelvis  Without Contrast (Preliminary result)  Result time 09/30/19 04:48:25    ED Interpretation by Selena Rajan MD (09/30/19 04:48:25, [facility], Emergency Medicine)    Acute appendicitis                            X-Rays:   Independently Interpreted Readings:   Abdomen:   Abdomen and Pelvis without Contrast - Acute appendicitis     Medical Decision Making:   Clinical Tests:   Lab Tests: Ordered and Reviewed  Radiological Study: Ordered and Reviewed                      Clinical Impression:       ICD-10-CM ICD-9-CM   1. Acute appendicitis with localized peritonitis, without perforation, abscess, or gangrene K35.30 540.9         Disposition:   Disposition: Discharged  Condition: Stable                        Selena Rajan MD  09/30/19 050

## 2019-09-30 NOTE — H&P
[facility]  General Surgery  History & Physical    Patient Name: Gabby Dill  MRN: [id number]  Admission Date: 9/30/2019  Attending Physician: Marcus Ruby Jr.*   Primary Care Provider: Harinder Galdamez MD    Patient information was obtained from patient, past medical records and ER records.     Subjective:     Chief Complaint/Reason for Admission: abdominal pain    History of Present Illness: 59-year-old female with history of hypertension and ulcerative colitis on lialda and aciphex presents from outside facility with moderate to severe acute onset RLQ abdominal pain described as sharp since 0100 today. Associated with nausea. Pain is not relieved by anything and is constant. Has not had this type of pain before. Denies fevers, chills, vomiting, or any other complaints at this time. Outside CT scan shows concern for acute appendicitis. No previous abdominal surgery. Last flair form ulcerative colitis several years ago.     No current facility-administered medications on file prior to encounter.      Current Outpatient Medications on File Prior to Encounter   Medication Sig    estradiol (ESTRACE) 1 MG tablet Take 1 tablet by mouth once daily.    hydrocodone-acetaminophen 5-325mg (NORCO) 5-325 mg per tablet Take 1 tablet by mouth 3 (three) times daily as needed.     LIALDA 1.2 gram TbEC Take 1.2 g by mouth daily with breakfast.     lisinopril-hydrochlorothiazide (PRINZIDE,ZESTORETIC) 20-12.5 mg per tablet TAKE 1 TABLET DAILY    methocarbamol (ROBAXIN) 750 MG Tab 750 mg 3 (three) times daily.     multivitamin (ONE DAILY MULTIVITAMIN) per tablet Take 1 tablet by mouth once daily.    progesterone (PROMETRIUM) 100 MG capsule Take 1 capsule by mouth once daily.    rabeprazole (ACIPHEX) 20 mg tablet Take 20 mg by mouth once daily.     ALPRAZolam (XANAX) 0.5 MG tablet TAKE 1 TABLET BY MOUTH EVERY DAY AS NEEDED FOR ANXIETY       Review of patient's allergies indicates:   Allergen  Reactions    Morphine Hives    Nsaids (non-steroidal anti-inflammatory drug) Other (See Comments)     GI bleed    Sulfa (sulfonamide antibiotics) Rash       Past Medical History:   Diagnosis Date    Arthritis     back and tendenosis of shoulder  CTS    Colitis 2011    Colitis     Hypertension      Past Surgical History:   Procedure Laterality Date    FACIAL FRACTURE SURGERY      PLANTAR FASCIA SURGERY Bilateral      Family History     Problem Relation (Age of Onset)    Cancer Father    Hypertension Mother, Father        Tobacco Use    Smoking status: Former Smoker     Last attempt to quit: 1995     Years since quittin.7   Substance and Sexual Activity    Alcohol use: Yes     Comment: occasionally    Drug use: No    Sexual activity: Not on file     Review of Systems   Constitutional: Negative for chills and fever.   HENT: Negative for sore throat.    Eyes: Negative for redness.   Respiratory: Negative for shortness of breath.    Cardiovascular: Negative for chest pain.   Gastrointestinal: Positive for abdominal pain and nausea.   Endocrine: Negative for polyuria.   Genitourinary: Negative for dysuria.   Musculoskeletal: Negative for back pain.   Skin: Negative for wound.   Neurological: Negative for headaches.   Psychiatric/Behavioral: Negative for agitation.     Objective:     Vital Signs (Most Recent):  Temp: (!) 100.4 °F (38 °C) (19)  Pulse: 103 (19)  Resp: 16 (19)  BP: 122/65 (19)  SpO2: 100 % (19) Vital Signs (24h Range):  Temp:  [97.6 °F (36.4 °C)-100.4 °F (38 °C)] 100.4 °F (38 °C)  Pulse:  [] 103  Resp:  [16-20] 16  SpO2:  [100 %] 100 %  BP: (115-141)/(62-79) 122/65     Weight: 78 kg (171 lb 15.3 oz)  Body mass index is 31.45 kg/m².    Physical Exam   Constitutional: She appears well-developed and well-nourished. No distress.   HENT:   Head: Normocephalic and atraumatic.   Eyes: EOM are normal.   Neck: Normal range of motion.    Cardiovascular: Normal rate.   Pulmonary/Chest: Effort normal.   Abdominal: Soft. There is tenderness (RLQ).   Neurological: She is alert.   Skin: Skin is warm and dry.   Psychiatric: She has a normal mood and affect. Her behavior is normal.   Nursing note and vitals reviewed.      Significant Labs:  CBC:   Recent Labs   Lab 09/30/19 0307   WBC 6.86   RBC 4.81   HGB 14.3   HCT 42.6      MCV 89   MCH 29.7   MCHC 33.6     CMP:   Recent Labs   Lab 09/30/19 0307   *   CALCIUM 9.0   ALBUMIN 4.0   PROT 6.7      K 3.7   CO2 23      BUN 26*   CREATININE 0.53   ALKPHOS 49   ALT 22   AST 25   BILITOT 0.8       Significant Diagnostics:  I have reviewed all pertinent imaging results/findings within the past 24 hours.    Assessment/Plan:     * Acute appendicitis with localized peritonitis, without perforation, abscess, or gangrene  59-year-old female with acute appendicitis.     -NPO  -mIVF  -IV abx  -PRN pain and nausea meds  -plan for OR this admission for lap appy      VTE Risk Mitigation (From admission, onward)         Ordered     IP VTE HIGH RISK PATIENT  Once      09/30/19 0924     Place sequential compression device  Until discontinued      09/30/19 0924                Franklin Hassan MD  General Surgery  [facility]

## 2019-09-30 NOTE — ASSESSMENT & PLAN NOTE
59-year-old female with acute appendicitis.     -NPO  -mIVF  -IV abx  -PRN pain and nausea meds  -plan for OR this admission for lap appy

## 2019-09-30 NOTE — NURSING
Patient is an admit from [facility].  Patient is awake, alert, and oriented.  Has complaints of pain to RLQ and received pain medications prior to coming to hospital.  Instructed on calling for any assistance when needed and patient verbalized complete understanding.  Patient states she can ambulates independently to the bathroom.  Safety is maintained with bed low, wheels locked and side rails up.  Call light within reach.  Will continue to monitor.

## 2019-09-30 NOTE — PLAN OF CARE
Notified dr robledo of pts admit and c/o nausea, headache and abd pain. Stated he will enter orders.

## 2019-09-30 NOTE — SUBJECTIVE & OBJECTIVE
No current facility-administered medications on file prior to encounter.      Current Outpatient Medications on File Prior to Encounter   Medication Sig    estradiol (ESTRACE) 1 MG tablet Take 1 tablet by mouth once daily.    hydrocodone-acetaminophen 5-325mg (NORCO) 5-325 mg per tablet Take 1 tablet by mouth 3 (three) times daily as needed.     LIALDA 1.2 gram TbEC Take 1.2 g by mouth daily with breakfast.     lisinopril-hydrochlorothiazide (PRINZIDE,ZESTORETIC) 20-12.5 mg per tablet TAKE 1 TABLET DAILY    methocarbamol (ROBAXIN) 750 MG Tab 750 mg 3 (three) times daily.     multivitamin (ONE DAILY MULTIVITAMIN) per tablet Take 1 tablet by mouth once daily.    progesterone (PROMETRIUM) 100 MG capsule Take 1 capsule by mouth once daily.    rabeprazole (ACIPHEX) 20 mg tablet Take 20 mg by mouth once daily.     ALPRAZolam (XANAX) 0.5 MG tablet TAKE 1 TABLET BY MOUTH EVERY DAY AS NEEDED FOR ANXIETY       Review of patient's allergies indicates:   Allergen Reactions    Morphine Hives    Nsaids (non-steroidal anti-inflammatory drug) Other (See Comments)     GI bleed    Sulfa (sulfonamide antibiotics) Rash       Past Medical History:   Diagnosis Date    Arthritis     back and tendenosis of shoulder  CTS    Colitis 2011    Colitis     Hypertension      Past Surgical History:   Procedure Laterality Date    FACIAL FRACTURE SURGERY      PLANTAR FASCIA SURGERY Bilateral      Family History     Problem Relation (Age of Onset)    Cancer Father    Hypertension Mother, Father        Tobacco Use    Smoking status: Former Smoker     Last attempt to quit: 1995     Years since quittin.7   Substance and Sexual Activity    Alcohol use: Yes     Comment: occasionally    Drug use: No    Sexual activity: Not on file     Review of Systems   Constitutional: Negative for chills and fever.   HENT: Negative for sore throat.    Eyes: Negative for redness.   Respiratory: Negative for shortness of breath.     Cardiovascular: Negative for chest pain.   Gastrointestinal: Positive for abdominal pain and nausea.   Endocrine: Negative for polyuria.   Genitourinary: Negative for dysuria.   Musculoskeletal: Negative for back pain.   Skin: Negative for wound.   Neurological: Negative for headaches.   Psychiatric/Behavioral: Negative for agitation.     Objective:     Vital Signs (Most Recent):  Temp: (!) 100.4 °F (38 °C) (09/30/19 0727)  Pulse: 103 (09/30/19 0727)  Resp: 16 (09/30/19 0727)  BP: 122/65 (09/30/19 0727)  SpO2: 100 % (09/30/19 0226) Vital Signs (24h Range):  Temp:  [97.6 °F (36.4 °C)-100.4 °F (38 °C)] 100.4 °F (38 °C)  Pulse:  [] 103  Resp:  [16-20] 16  SpO2:  [100 %] 100 %  BP: (115-141)/(62-79) 122/65     Weight: 78 kg (171 lb 15.3 oz)  Body mass index is 31.45 kg/m².    Physical Exam   Constitutional: She appears well-developed and well-nourished. No distress.   HENT:   Head: Normocephalic and atraumatic.   Eyes: EOM are normal.   Neck: Normal range of motion.   Cardiovascular: Normal rate.   Pulmonary/Chest: Effort normal.   Abdominal: Soft. There is tenderness (RLQ).   Neurological: She is alert.   Skin: Skin is warm and dry.   Psychiatric: She has a normal mood and affect. Her behavior is normal.   Nursing note and vitals reviewed.      Significant Labs:  CBC:   Recent Labs   Lab 09/30/19  0307   WBC 6.86   RBC 4.81   HGB 14.3   HCT 42.6      MCV 89   MCH 29.7   MCHC 33.6     CMP:   Recent Labs   Lab 09/30/19 0307   *   CALCIUM 9.0   ALBUMIN 4.0   PROT 6.7      K 3.7   CO2 23      BUN 26*   CREATININE 0.53   ALKPHOS 49   ALT 22   AST 25   BILITOT 0.8       Significant Diagnostics:  I have reviewed all pertinent imaging results/findings within the past 24 hours.

## 2019-09-30 NOTE — ANESTHESIA PREPROCEDURE EVALUATION
09/30/2019  Gabby Dill is a 59 y.o., female with PMHx of HTN, ulcerative colitis and acute appendicitis scheduled for laparoscopic appendectomy 10/1/19.     Anesthesia Evaluation    I have reviewed the Patient Summary Reports.    I have reviewed the Nursing Notes.      Review of Systems  Anesthesia Hx:  Hx of Anesthetic complications Postop nausea following facial fracture repair in 2007 Denies Family Hx of Anesthesia complications.    Social:  Former Smoker, Social Alcohol Use    Hematology/Oncology:  Hematology Normal        EENT/Dental:EENT/Dental Normal   Cardiovascular:   Exercise tolerance: good Hypertension, well controlled Denies Valvular problems/Murmurs.  Denies MI.  Denies CAD.    Denies Dysrhythmias.   Denies Angina.                Pulmonary:   Denies Shortness of breath.  Denies Sleep Apnea.    Renal/:   renal calculi 4 mm nonobstructive stone in left kidney incindental finding on CT A/P. Pt denies dysuria, hematuria, previous intervention for renal stones.    Hepatic/GI:   Acute appendicitis - Admitted to Surgery 9/30 after acute onset RLQ pain. CT A/P showappendiceal stones and small amount of peritoneal fluid. On IVF, Zosyn, Zofran.  Ulcerative colitis - last flair several years ago. on mesalamine and rabeprazole.  Diverticulosis         Musculoskeletal:   Arthritis  Chronic LBP - Norco and Robaxin    Carpal tunnel syndrome   Neurological:   Denies TIA. Denies CVA. Denies Seizures.    Endocrine:   Denies Diabetes.    Psych:   anxiety          Physical Exam  General:  Well nourished, Obesity    Airway/Jaw/Neck:  Airway Findings: Mouth Opening: Normal Tongue: Normal  General Airway Assessment: Adult  Mallampati: IV  TM Distance: Normal, at least 6 cm  Jaw/Neck Findings:  Neck ROM: Normal ROM      Dental:  Dental Findings: In tact   Chest/Lungs:  Chest/Lungs Findings: Clear to  auscultation, Normal Respiratory Rate     Heart/Vascular:  Heart Findings: Rate: Normal  Rhythm: Regular Rhythm  Sounds: Normal     Abdomen:  Abdomen Findings:  Tenderness     Musculoskeletal:  Musculoskeletal Findings: Normal    Mental Status:  Mental Status Findings:  Cooperative, Alert and Oriented         Anesthesia Plan  Type of Anesthesia, risks & benefits discussed:  Anesthesia Type:  general  Patient's Preference:   Intra-op Monitoring Plan: standard ASA monitors  Intra-op Monitoring Plan Comments:   Post Op Pain Control Plan: per primary service following discharge from PACU and multimodal analgesia  Post Op Pain Control Plan Comments:   Induction:   IV  Beta Blocker:  Patient is not currently on a Beta-Blocker (No further documentation required).       Informed Consent: Patient understands risks and agrees with Anesthesia plan.  Questions answered. Anesthesia consent signed with patient.  ASA Score: 2     Day of Surgery Review of History & Physical:  There are no significant changes.          Ready For Surgery From Anesthesia Perspective.

## 2019-09-30 NOTE — PLAN OF CARE
TN met with pt and pt's  Max at bedside for discharge planning. Pt lives with spouse who will provide assistance upon discharge. Pt independent with ADLs upon admission to hospital. Pt able to drive self. Pt's spouse to provide transportation at discharge. Pt possibly to go to surgery today for appendix removal.    Discharge brochure and blue discharge folder given to pt. TN updated contact information on whiteboard.         09/30/19 1919   Discharge Assessment   Assessment Type Discharge Planning Assessment   Confirmed/corrected address and phone number on facesheet? Yes   Assessment information obtained from? Patient;Caregiver   Expected Length of Stay (days) 2   Communicated expected length of stay with patient/caregiver yes   Prior to hospitilization cognitive status: Alert/Oriented   Prior to hospitalization functional status: Independent   Current cognitive status: Alert/Oriented   Current Functional Status: Independent   Lives With alone;spouse   Able to Return to Prior Arrangements yes   Is patient able to care for self after discharge? Yes   Who are your caregiver(s) and their phone number(s)? Max Miguel [phone]   Patient's perception of discharge disposition home or selfcare   Readmission Within the Last 30 Days no previous admission in last 30 days   Patient currently being followed by outpatient case management? No   Patient currently receives any other outside agency services? No   Equipment Currently Used at Home none   Do you have any problems affording any of your prescribed medications? No   Is the patient taking medications as prescribed? yes   Does the patient have transportation home? Yes   Transportation Anticipated family or friend will provide   Does the patient receive services at the [facility]? No   Discharge Plan A Home;Home with family   Discharge Plan B Home;Home with family   DME Needed Upon Discharge  none   Patient/Family in Agreement with Plan yes

## 2019-10-01 ENCOUNTER — ANESTHESIA (OUTPATIENT)
Dept: SURGERY | Facility: HOSPITAL | Age: 59
End: 2019-10-01
Payer: COMMERCIAL

## 2019-10-01 VITALS
WEIGHT: 179.25 LBS | RESPIRATION RATE: 14 BRPM | TEMPERATURE: 99 F | HEART RATE: 78 BPM | SYSTOLIC BLOOD PRESSURE: 136 MMHG | BODY MASS INDEX: 32.99 KG/M2 | HEIGHT: 62 IN | DIASTOLIC BLOOD PRESSURE: 65 MMHG | OXYGEN SATURATION: 97 %

## 2019-10-01 LAB
BASOPHILS # BLD AUTO: 0 K/UL (ref 0–0.2)
BASOPHILS NFR BLD: 0 % (ref 0–1.9)
DIFFERENTIAL METHOD: ABNORMAL
EOSINOPHIL # BLD AUTO: 0 K/UL (ref 0–0.5)
EOSINOPHIL NFR BLD: 0 % (ref 0–8)
ERYTHROCYTE [DISTWIDTH] IN BLOOD BY AUTOMATED COUNT: 13 % (ref 11.5–14.5)
HCT VFR BLD AUTO: 40.1 % (ref 37–48.5)
HGB BLD-MCNC: 13.4 G/DL (ref 12–16)
LYMPHOCYTES # BLD AUTO: 0.5 K/UL (ref 1–4.8)
LYMPHOCYTES NFR BLD: 6.6 % (ref 18–48)
MCH RBC QN AUTO: 29.6 PG (ref 27–31)
MCHC RBC AUTO-ENTMCNC: 33.4 G/DL (ref 32–36)
MCV RBC AUTO: 89 FL (ref 82–98)
MONOCYTES # BLD AUTO: 0.5 K/UL (ref 0.3–1)
MONOCYTES NFR BLD: 6.6 % (ref 4–15)
NEUTROPHILS # BLD AUTO: 6.8 K/UL (ref 1.8–7.7)
NEUTROPHILS NFR BLD: 86.8 % (ref 38–73)
PLATELET # BLD AUTO: 187 K/UL (ref 150–350)
PMV BLD AUTO: 9.6 FL (ref 9.2–12.9)
RBC # BLD AUTO: 4.53 M/UL (ref 4–5.4)
WBC # BLD AUTO: 7.87 K/UL (ref 3.9–12.7)

## 2019-10-01 PROCEDURE — 96361 HYDRATE IV INFUSION ADD-ON: CPT | Mod: 59

## 2019-10-01 PROCEDURE — 90686 IIV4 VACC NO PRSV 0.5 ML IM: CPT | Performed by: STUDENT IN AN ORGANIZED HEALTH CARE EDUCATION/TRAINING PROGRAM

## 2019-10-01 PROCEDURE — 44970 LAPAROSCOPY APPENDECTOMY: CPT | Mod: ,,, | Performed by: SURGERY

## 2019-10-01 PROCEDURE — 27201423 OPTIME MED/SURG SUP & DEVICES STERILE SUPPLY: Performed by: SURGERY

## 2019-10-01 PROCEDURE — 63600175 PHARM REV CODE 636 W HCPCS: Performed by: NURSE ANESTHETIST, CERTIFIED REGISTERED

## 2019-10-01 PROCEDURE — 36000708 HC OR TIME LEV III 1ST 15 MIN: Performed by: SURGERY

## 2019-10-01 PROCEDURE — 90471 IMMUNIZATION ADMIN: CPT | Performed by: STUDENT IN AN ORGANIZED HEALTH CARE EDUCATION/TRAINING PROGRAM

## 2019-10-01 PROCEDURE — G0378 HOSPITAL OBSERVATION PER HR: HCPCS

## 2019-10-01 PROCEDURE — 37000008 HC ANESTHESIA 1ST 15 MINUTES: Performed by: SURGERY

## 2019-10-01 PROCEDURE — 85025 COMPLETE CBC W/AUTO DIFF WBC: CPT

## 2019-10-01 PROCEDURE — 37000009 HC ANESTHESIA EA ADD 15 MINS: Performed by: SURGERY

## 2019-10-01 PROCEDURE — 36000709 HC OR TIME LEV III EA ADD 15 MIN: Performed by: SURGERY

## 2019-10-01 PROCEDURE — 44970 PR LAP,APPENDECTOMY: ICD-10-PCS | Mod: ,,, | Performed by: SURGERY

## 2019-10-01 PROCEDURE — 63600175 PHARM REV CODE 636 W HCPCS: Performed by: STUDENT IN AN ORGANIZED HEALTH CARE EDUCATION/TRAINING PROGRAM

## 2019-10-01 PROCEDURE — 25000003 PHARM REV CODE 250: Performed by: SURGERY

## 2019-10-01 PROCEDURE — 88304 TISSUE SPECIMEN TO PATHOLOGY: ICD-10-PCS | Mod: 26,,, | Performed by: PATHOLOGY

## 2019-10-01 PROCEDURE — 71000033 HC RECOVERY, INTIAL HOUR: Performed by: SURGERY

## 2019-10-01 PROCEDURE — 88304 TISSUE EXAM BY PATHOLOGIST: CPT | Performed by: PATHOLOGY

## 2019-10-01 PROCEDURE — 63600175 PHARM REV CODE 636 W HCPCS: Performed by: SURGERY

## 2019-10-01 PROCEDURE — 25000003 PHARM REV CODE 250: Performed by: NURSE ANESTHETIST, CERTIFIED REGISTERED

## 2019-10-01 PROCEDURE — 36415 COLL VENOUS BLD VENIPUNCTURE: CPT

## 2019-10-01 PROCEDURE — 96375 TX/PRO/DX INJ NEW DRUG ADDON: CPT | Mod: 59

## 2019-10-01 PROCEDURE — 88304 TISSUE EXAM BY PATHOLOGIST: CPT | Mod: 26,,, | Performed by: PATHOLOGY

## 2019-10-01 RX ORDER — ONDANSETRON 2 MG/ML
INJECTION INTRAMUSCULAR; INTRAVENOUS
Status: DISCONTINUED | OUTPATIENT
Start: 2019-10-01 | End: 2019-10-01

## 2019-10-01 RX ORDER — LIDOCAINE HCL/PF 100 MG/5ML
SYRINGE (ML) INTRAVENOUS
Status: DISCONTINUED | OUTPATIENT
Start: 2019-10-01 | End: 2019-10-01

## 2019-10-01 RX ORDER — SODIUM CHLORIDE 0.9 % (FLUSH) 0.9 %
3 SYRINGE (ML) INJECTION
Status: DISCONTINUED | OUTPATIENT
Start: 2019-10-01 | End: 2019-10-01 | Stop reason: HOSPADM

## 2019-10-01 RX ORDER — FENTANYL CITRATE 50 UG/ML
INJECTION, SOLUTION INTRAMUSCULAR; INTRAVENOUS
Status: DISCONTINUED | OUTPATIENT
Start: 2019-10-01 | End: 2019-10-01

## 2019-10-01 RX ORDER — SODIUM CHLORIDE, SODIUM LACTATE, POTASSIUM CHLORIDE, CALCIUM CHLORIDE 600; 310; 30; 20 MG/100ML; MG/100ML; MG/100ML; MG/100ML
INJECTION, SOLUTION INTRAVENOUS CONTINUOUS PRN
Status: DISCONTINUED | OUTPATIENT
Start: 2019-10-01 | End: 2019-10-01

## 2019-10-01 RX ORDER — DIPHENHYDRAMINE HYDROCHLORIDE 50 MG/ML
12.5 INJECTION INTRAMUSCULAR; INTRAVENOUS EVERY 6 HOURS PRN
Status: DISCONTINUED | OUTPATIENT
Start: 2019-10-01 | End: 2019-10-01 | Stop reason: HOSPADM

## 2019-10-01 RX ORDER — BUPIVACAINE HYDROCHLORIDE 2.5 MG/ML
INJECTION, SOLUTION EPIDURAL; INFILTRATION; INTRACAUDAL
Status: DISCONTINUED | OUTPATIENT
Start: 2019-10-01 | End: 2019-10-01 | Stop reason: HOSPADM

## 2019-10-01 RX ORDER — HYDROMORPHONE HYDROCHLORIDE 2 MG/ML
0.5 INJECTION, SOLUTION INTRAMUSCULAR; INTRAVENOUS; SUBCUTANEOUS EVERY 5 MIN PRN
Status: DISCONTINUED | OUTPATIENT
Start: 2019-10-01 | End: 2019-10-01 | Stop reason: HOSPADM

## 2019-10-01 RX ORDER — SUCCINYLCHOLINE CHLORIDE 20 MG/ML
INJECTION INTRAMUSCULAR; INTRAVENOUS
Status: DISCONTINUED | OUTPATIENT
Start: 2019-10-01 | End: 2019-10-01

## 2019-10-01 RX ORDER — PHENYLEPHRINE HYDROCHLORIDE 10 MG/ML
INJECTION INTRAVENOUS
Status: DISCONTINUED | OUTPATIENT
Start: 2019-10-01 | End: 2019-10-01

## 2019-10-01 RX ORDER — DIPHENHYDRAMINE HYDROCHLORIDE 50 MG/ML
12.5 INJECTION INTRAMUSCULAR; INTRAVENOUS ONCE
Status: COMPLETED | OUTPATIENT
Start: 2019-10-01 | End: 2019-10-01

## 2019-10-01 RX ORDER — ROCURONIUM BROMIDE 10 MG/ML
INJECTION, SOLUTION INTRAVENOUS
Status: DISCONTINUED | OUTPATIENT
Start: 2019-10-01 | End: 2019-10-01

## 2019-10-01 RX ORDER — HYDROMORPHONE HYDROCHLORIDE 2 MG/ML
1 INJECTION, SOLUTION INTRAMUSCULAR; INTRAVENOUS; SUBCUTANEOUS ONCE
Status: COMPLETED | OUTPATIENT
Start: 2019-10-01 | End: 2019-10-01

## 2019-10-01 RX ORDER — PROPOFOL 10 MG/ML
VIAL (ML) INTRAVENOUS
Status: DISCONTINUED | OUTPATIENT
Start: 2019-10-01 | End: 2019-10-01

## 2019-10-01 RX ORDER — ONDANSETRON 2 MG/ML
4 INJECTION INTRAMUSCULAR; INTRAVENOUS DAILY PRN
Status: DISCONTINUED | OUTPATIENT
Start: 2019-10-01 | End: 2019-10-01 | Stop reason: HOSPADM

## 2019-10-01 RX ORDER — ACETAMINOPHEN 10 MG/ML
INJECTION, SOLUTION INTRAVENOUS
Status: DISCONTINUED | OUTPATIENT
Start: 2019-10-01 | End: 2019-10-01

## 2019-10-01 RX ORDER — MIDAZOLAM HYDROCHLORIDE 1 MG/ML
INJECTION, SOLUTION INTRAMUSCULAR; INTRAVENOUS
Status: DISCONTINUED | OUTPATIENT
Start: 2019-10-01 | End: 2019-10-01

## 2019-10-01 RX ORDER — ACETAMINOPHEN 10 MG/ML
1000 INJECTION, SOLUTION INTRAVENOUS ONCE
Status: DISCONTINUED | OUTPATIENT
Start: 2019-10-01 | End: 2019-10-01 | Stop reason: HOSPADM

## 2019-10-01 RX ADMIN — MIDAZOLAM 2 MG: 1 INJECTION INTRAMUSCULAR; INTRAVENOUS at 06:10

## 2019-10-01 RX ADMIN — SODIUM CHLORIDE, SODIUM LACTATE, POTASSIUM CHLORIDE, AND CALCIUM CHLORIDE: .6; .31; .03; .02 INJECTION, SOLUTION INTRAVENOUS at 07:10

## 2019-10-01 RX ADMIN — HYDROMORPHONE HYDROCHLORIDE 1 MG: 2 INJECTION, SOLUTION INTRAMUSCULAR; INTRAVENOUS; SUBCUTANEOUS at 04:10

## 2019-10-01 RX ADMIN — SUCCINYLCHOLINE CHLORIDE 120 MG: 20 INJECTION, SOLUTION INTRAMUSCULAR; INTRAVENOUS at 07:10

## 2019-10-01 RX ADMIN — LIDOCAINE HYDROCHLORIDE 100 MG: 20 INJECTION, SOLUTION INTRAVENOUS at 07:10

## 2019-10-01 RX ADMIN — PHENYLEPHRINE HYDROCHLORIDE 100 MCG: 10 INJECTION INTRAVENOUS at 08:10

## 2019-10-01 RX ADMIN — Medication 50 MG: at 07:10

## 2019-10-01 RX ADMIN — INFLUENZA VIRUS VACCINE 0.5 ML: 15; 15; 15; 15 SUSPENSION INTRAMUSCULAR at 01:10

## 2019-10-01 RX ADMIN — PHENYLEPHRINE HYDROCHLORIDE 100 MCG: 10 INJECTION INTRAVENOUS at 07:10

## 2019-10-01 RX ADMIN — ONDANSETRON 8 MG: 2 INJECTION, SOLUTION INTRAMUSCULAR; INTRAVENOUS at 08:10

## 2019-10-01 RX ADMIN — DIPHENHYDRAMINE HYDROCHLORIDE 12.5 MG: 50 INJECTION, SOLUTION INTRAMUSCULAR; INTRAVENOUS at 04:10

## 2019-10-01 RX ADMIN — PIPERACILLIN AND TAZOBACTAM 4.5 G: 4; .5 INJECTION, POWDER, LYOPHILIZED, FOR SOLUTION INTRAVENOUS; PARENTERAL at 04:10

## 2019-10-01 RX ADMIN — ONDANSETRON 8 MG: 8 TABLET, ORALLY DISINTEGRATING ORAL at 04:10

## 2019-10-01 RX ADMIN — ROCURONIUM BROMIDE 10 MG: 10 INJECTION, SOLUTION INTRAVENOUS at 07:10

## 2019-10-01 RX ADMIN — ACETAMINOPHEN 1000 MG: 10 INJECTION, SOLUTION INTRAVENOUS at 08:10

## 2019-10-01 RX ADMIN — OXYCODONE HYDROCHLORIDE 5 MG: 5 TABLET ORAL at 02:10

## 2019-10-01 RX ADMIN — FENTANYL CITRATE 100 MCG: 50 INJECTION, SOLUTION INTRAMUSCULAR; INTRAVENOUS at 07:10

## 2019-10-01 RX ADMIN — PROPOFOL 180 MG: 10 INJECTION, EMULSION INTRAVENOUS at 07:10

## 2019-10-01 RX ADMIN — SODIUM CHLORIDE, SODIUM LACTATE, POTASSIUM CHLORIDE, AND CALCIUM CHLORIDE: .6; .31; .03; .02 INJECTION, SOLUTION INTRAVENOUS at 03:10

## 2019-10-01 RX ADMIN — ROCURONIUM BROMIDE 20 MG: 10 INJECTION, SOLUTION INTRAVENOUS at 07:10

## 2019-10-01 NOTE — ANESTHESIA POSTPROCEDURE EVALUATION
Anesthesia Post Evaluation    Patient: Gabby Dill    Procedure(s) Performed: Procedure(s) (LRB):  APPENDECTOMY, LAPAROSCOPIC (N/A)    Final Anesthesia Type: general  Patient location during evaluation: PACU  Patient participation: Yes- Able to Participate  Level of consciousness: awake and alert, oriented and awake  Post-procedure vital signs: reviewed and stable  Pain management: adequate  Airway patency: patent  PONV status at discharge: No PONV  Anesthetic complications: no      Cardiovascular status: blood pressure returned to baseline  Respiratory status: unassisted and room air  Hydration status: euvolemic  Follow-up not needed.          Vitals Value Taken Time   /56 10/1/2019  9:45 AM   Temp 37.4 °C (99.4 °F) 10/1/2019  4:26 AM   Pulse 72 10/1/2019  9:45 AM   Resp 13 10/1/2019  9:45 AM   SpO2 93 % 10/1/2019  9:45 AM   Vitals shown include unvalidated device data.      Event Time     Out of Recovery 10/01/2019 09:46:49          Pain/Shashi Score: Pain Rating Prior to Med Admin: 10 (10/1/2019  4:46 AM)  Pain Rating Post Med Admin: 8 (9/30/2019  9:27 PM)

## 2019-10-01 NOTE — PLAN OF CARE
Patient sleeping, arousable. VSS. Denies any pain at this time. Dr. Clyde Huntley to release patient from PACU. Report to Razia ROSADO with time for questions, verbalized understanding. Patient discharged to floor via stretcher.

## 2019-10-01 NOTE — TRANSFER OF CARE
"Anesthesia Transfer of Care Note    Patient: Gabby Dill    Procedure(s) Performed: Procedure(s) (LRB):  APPENDECTOMY, LAPAROSCOPIC (N/A)    Patient location: PACU    Anesthesia Type: general    Transport from OR: Transported from OR on 6-10 L/min O2 by face mask with adequate spontaneous ventilation    Post pain: adequate analgesia    Post assessment: no apparent anesthetic complications    Post vital signs: stable    Level of consciousness: awake, alert and oriented    Nausea/Vomiting: no nausea/vomiting    Complications: none    Transfer of care protocol was followed      Last vitals:   Visit Vitals  BP (!) 147/66   Pulse 76   Temp 37.4 °C (99.4 °F) (Oral)   Resp 19   Ht 5' 2" (1.575 m)   Wt 81.3 kg (179 lb 3.7 oz)   SpO2 99%   Breastfeeding? No   BMI 32.78 kg/m²     "

## 2019-10-01 NOTE — ASSESSMENT & PLAN NOTE
59-year-old female with acute appendicitis.     -NPO  -mIVF  -IV abx  -PRN pain and nausea meds  -plan for OR this morning and possible d/c later today

## 2019-10-01 NOTE — NURSING
Report received from ROWAN Weems RN. Pt currently off floor, in pre-op. Will resume care upon return to floor.

## 2019-10-01 NOTE — DISCHARGE SUMMARY
[facility]  General Surgery  Discharge Summary      Patient Name: Gabby Dill  MRN: [id number]  Admission Date: 9/30/2019  Hospital Length of Stay: 0 days  Discharge Date and Time: 10/1/2019  2:42 PM  Attending Physician: No att. providers found   Discharging Provider: Franklin Hassan MD  Primary Care Provider: Harinder Galdamez MD    HPI:   59-year-old female with history of hypertension and ulcerative colitis on lialda and aciphex presents from outside facility with moderate to severe acute onset RLQ abdominal pain described as sharp since 0100 today. Associated with nausea. Pain is not relieved by anything and is constant. Has not had this type of pain before. Denies fevers, chills, vomiting, or any other complaints at this time. Outside CT scan shows concern for acute appendicitis. No previous abdominal surgery. Last flair form ulcerative colitis several years ago.     Procedure(s) (LRB):  APPENDECTOMY, LAPAROSCOPIC (N/A)      Indwelling Lines/Drains at time of discharge:   Lines/Drains/Airways     None               Hospital Course: Patient admitted with acute appendicitis. Started on IV abx. Underwent lap appy hospital day 1 which she tolerated very well. Diet was advanced and patient was able to void. Good PO pain control and ambulating. Discharged home with close follow up.     Consults:     Significant Diagnostic Studies: Labs: All labs within the past 24 hours have been reviewed    Pending Diagnostic Studies:     None        Final Active Diagnoses:    Diagnosis Date Noted POA    PRINCIPAL PROBLEM:  Acute appendicitis with localized peritonitis, without perforation, abscess, or gangrene [K35.30] 09/30/2019 Yes      Problems Resolved During this Admission:      Discharged Condition: good    Disposition: Home or Self Care    Follow Up:  Follow-up Information     Marcus Ruby Jr, MD In 2 weeks.    Specialty:  General Surgery  Why:  For wound re-check  Contact information:  [address] [address]  [phone]                 Patient Instructions:      Diet Adult Regular     Notify your health care provider if you experience any of the following:  temperature >100.4     Notify your health care provider if you experience any of the following:  persistent nausea and vomiting or diarrhea     Notify your health care provider if you experience any of the following:  severe uncontrolled pain     Notify your health care provider if you experience any of the following:  redness, tenderness, or signs of infection (pain, swelling, redness, odor or green/yellow discharge around incision site)     Notify your health care provider if you experience any of the following:  difficulty breathing or increased cough     Notify your health care provider if you experience any of the following:  severe persistent headache     Notify your health care provider if you experience any of the following:  worsening rash     Notify your health care provider if you experience any of the following:  persistent dizziness, light-headedness, or visual disturbances     Notify your health care provider if you experience any of the following:  increased confusion or weakness     Activity as tolerated   Order Comments: No lifting greater than 10 pounds for 4 weeks from day of surgery.  No pushing/pulling such as vacuuming or raking.  No straining, avoid constipation and take stool softeners as described and laxatives as needed.  No driving while on narcotics and until you can react quickly without pain.     Shower on day dressing removed (No bath)   Order Comments: You can shower in 24 hours. In the shower, it is okay to let warm soapy water rinse over your wound. Do not submerge your wound in any tubs, baths, spas, or pools. Over your wound you have glue. This will fall off on its own.     Medications:  Reconciled Home Medications:      Medication List      CONTINUE taking these medications    ALPRAZolam 0.5 MG  tablet  Commonly known as:  XANAX  TAKE 1 TABLET BY MOUTH EVERY DAY AS NEEDED FOR ANXIETY     estradiol 1 MG tablet  Commonly known as:  ESTRACE  Take 1 tablet by mouth once daily.     HYDROcodone-acetaminophen 5-325 mg per tablet  Commonly known as:  NORCO  Take 1 tablet by mouth 3 (three) times daily as needed.     LIALDA 1.2 gram Tbec  Generic drug:  mesalamine  Take 1.2 g by mouth daily with breakfast.     lisinopril-hydrochlorothiazide 20-12.5 mg per tablet  Commonly known as:  PRINZIDE,ZESTORETIC  TAKE 1 TABLET DAILY     methocarbamol 750 MG Tab  Commonly known as:  ROBAXIN  750 mg 3 (three) times daily.     ONE DAILY MULTIVITAMIN per tablet  Generic drug:  multivitamin  Take 1 tablet by mouth once daily.     progesterone 100 MG capsule  Commonly known as:  PROMETRIUM  Take 1 capsule by mouth once daily.     RABEprazole 20 mg tablet  Commonly known as:  ACIPHEX  Take 20 mg by mouth once daily.          Time spent on the discharge of patient: 15 minutes    Franklin Hassan MD  General Surgery  [facility]

## 2019-10-01 NOTE — PROGRESS NOTES
[facility]  General Surgery  Progress Note    Subjective:     History of Present Illness:  59-year-old female with history of hypertension and ulcerative colitis on lialda and aciphex presents from outside facility with moderate to severe acute onset RLQ abdominal pain described as sharp since 0100 today. Associated with nausea. Pain is not relieved by anything and is constant. Has not had this type of pain before. Denies fevers, chills, vomiting, or any other complaints at this time. Outside CT scan shows concern for acute appendicitis. No previous abdominal surgery. Last flair form ulcerative colitis several years ago.     Post-Op Info:  Procedure(s) (LRB):  APPENDECTOMY, LAPAROSCOPIC (N/A)   Day of Surgery     Interval History: Some pain overnight controlled with IV dilaudid. No other acute events.     Medications:  Continuous Infusions:   lactated ringers 125 mL/hr at 10/01/19 0300     Scheduled Meds:   piperacillin-tazobactam (ZOSYN) IVPB  4.5 g Intravenous Q8H     PRN Meds:acetaminophen, acetaminophen, ALPRAZolam, influenza, ondansetron, oxyCODONE, promethazine (PHENERGAN) IVPB, sodium chloride 0.9%     Review of patient's allergies indicates:   Allergen Reactions    Morphine Hives    Nsaids (non-steroidal anti-inflammatory drug) Other (See Comments)     GI bleed    Sulfa (sulfonamide antibiotics) Rash     Objective:     Vital Signs (Most Recent):  Temp: 99.4 °F (37.4 °C) (10/01/19 0426)  Pulse: 102 (10/01/19 0426)  Resp: 20 (10/01/19 0426)  BP: 130/82 (10/01/19 0426)  SpO2: 100 % (09/30/19 0226) Vital Signs (24h Range):  Temp:  [98.3 °F (36.8 °C)-100.4 °F (38 °C)] 99.4 °F (37.4 °C)  Pulse:  [] 102  Resp:  [16-20] 20  BP: (109-133)/(57-82) 130/82     Weight: 81.3 kg (179 lb 3.7 oz)  Body mass index is 32.78 kg/m².    Intake/Output - Last 3 Shifts       09/29 0700 - 09/30 0659 09/30 0700 - 10/01 0659    P.O.  720    I.V. (mL/kg)  1035.4 (12.7)    IV Piggyback 1100 250    Total  Intake(mL/kg) 1100 (14.3) 2005.4 (24.7)    Net +1100 +2005.4          Urine Occurrence  6 x          Physical Exam   Cardiovascular: Normal rate.   Pulmonary/Chest: Effort normal.   Abdominal: Soft. She exhibits no distension. There is tenderness (RLQ).   Nursing note and vitals reviewed.      Significant Labs:  CBC:   Recent Labs   Lab 10/01/19  0521   WBC 7.87   RBC 4.53   HGB 13.4   HCT 40.1      MCV 89   MCH 29.6   MCHC 33.4       Significant Diagnostics:  I have reviewed all pertinent imaging results/findings within the past 24 hours.    Assessment/Plan:     * Acute appendicitis with localized peritonitis, without perforation, abscess, or gangrene  59-year-old female with acute appendicitis.     -NPO  -mIVF  -IV abx  -PRN pain and nausea meds  -plan for OR this morning and possible d/c later today        Franklin Hassan MD  General Surgery  [facility]

## 2019-10-01 NOTE — SUBJECTIVE & OBJECTIVE
Interval History: Some pain overnight controlled with IV dilaudid. No other acute events.     Medications:  Continuous Infusions:   lactated ringers 125 mL/hr at 10/01/19 0300     Scheduled Meds:   piperacillin-tazobactam (ZOSYN) IVPB  4.5 g Intravenous Q8H     PRN Meds:acetaminophen, acetaminophen, ALPRAZolam, influenza, ondansetron, oxyCODONE, promethazine (PHENERGAN) IVPB, sodium chloride 0.9%     Review of patient's allergies indicates:   Allergen Reactions    Morphine Hives    Nsaids (non-steroidal anti-inflammatory drug) Other (See Comments)     GI bleed    Sulfa (sulfonamide antibiotics) Rash     Objective:     Vital Signs (Most Recent):  Temp: 99.4 °F (37.4 °C) (10/01/19 0426)  Pulse: 102 (10/01/19 0426)  Resp: 20 (10/01/19 0426)  BP: 130/82 (10/01/19 0426)  SpO2: 100 % (09/30/19 0226) Vital Signs (24h Range):  Temp:  [98.3 °F (36.8 °C)-100.4 °F (38 °C)] 99.4 °F (37.4 °C)  Pulse:  [] 102  Resp:  [16-20] 20  BP: (109-133)/(57-82) 130/82     Weight: 81.3 kg (179 lb 3.7 oz)  Body mass index is 32.78 kg/m².    Intake/Output - Last 3 Shifts       09/29 0700 - 09/30 0659 09/30 0700 - 10/01 0659    P.O.  720    I.V. (mL/kg)  1035.4 (12.7)    IV Piggyback 1100 250    Total Intake(mL/kg) 1100 (14.3) 2005.4 (24.7)    Net +1100 +2005.4          Urine Occurrence  6 x          Physical Exam   Cardiovascular: Normal rate.   Pulmonary/Chest: Effort normal.   Abdominal: Soft. She exhibits no distension. There is tenderness (RLQ).   Nursing note and vitals reviewed.      Significant Labs:  CBC:   Recent Labs   Lab 10/01/19  0521   WBC 7.87   RBC 4.53   HGB 13.4   HCT 40.1      MCV 89   MCH 29.6   MCHC 33.4       Significant Diagnostics:  I have reviewed all pertinent imaging results/findings within the past 24 hours.

## 2019-10-01 NOTE — OP NOTE
DATE OF PROCEDURE: 10/01/2019    PREOPERATIVE DIAGNOSIS: Acute appendicitis    POSTOPERATIVE DIAGNOSIS: same    PROCEDURE: Laparoscopic appendectomy    SURGEON: Marcus Ruby M.D    ASSISTANT: Franklin Hassan MD PGY II    ANESTHESIA: General endotracheal    ESTIMATED BLOOD LOSS: 20 cc    SPECIMEN: Appendix    CONDITION: Stable    COMPLICATIONS: None    FINDINGS:   1) Dilated and inflamed appendix with early necrosis but no perforation      INDICATIONS: The patient is a 59 y.o. female who presented to the ER with ~12 hours of abd pain that localized to the RLQ. Work up including CT scan demonstrated suspeced uncomplicated appendicitis. The patient was seen and treatment options including surgery versus antibiotic therapy were discussed. After R/B/A were reviewed the patient elected to proceed with laparoscopic appendectomy. The patient demonstrated understanding of these risks and a consent form was obtained.    PROCEDURE IN DETAIL: The patient was identified in the Preoperative Unit and taken back to the Operating Room and laid supine on the operating room table. IV antibiotics were recently administered. General anesthesia was induced without complication. A suresh was placed to decompress the bladder. The patient was then prepped and draped in the standard sterile fashion. A tmeout was performed in accordance with hospital protocol. A stab incision was made at palmers point and a Veress needle was inserted.  Insufflation was attached adequate pneumoperitoneum was achieved.  Then made a 1 cm incision at the supraumbilical location after injecting local anesthetic.  A 5 mm trocar was placed under direct visualization with an Optiview cannula.  The Veress insertion site was inspected and no obvious injury was present. A 12 mm port was then placed in the left lower quadrant after injecting local anesthetic.  A 5 mm port was placed in the suprapubic region after injecting local anesthetic.  The abdomen was inspected. The  RLQ showed some inflammatory fluid but no evidence of perforation or phlegmon. The small bowel and colon appeared normal without fat creeping or inflammation. The uterus appeared mildly inflamed but the ovaries were normal without evidence of torsion or abscess. The appendix was identified and found to be thickened and inflamed.  There is early necrosis from the mid point to the distal point but again no perforation. There were some adhesions of the appendix to the lateral abdominal wall and to the terminal ileum. These were divided and the base of the appendix isolated. A window was created through the mesoappendix. The appendix was then transected at its base with a blue load stapler. An additional local was fired due to minor leakage. After this there is no leakage present.  The mesoappendix was transected with a white load stapler. There was bleeding of the artery from the staple line therefore a 0 Vicryl endoloop was used to control the artery.  The appendix was then placed in an endocath bag and removed from the umbilical port site. The cecum was inspected and the staple lines visualized. Hemostasis was present. The ports were then removed and the abdomen desulfated. An 0-Vicryl fascia stitch was placed at the left lower quadrant incision. The incisions were then injected with additional local anesthetic. Deep dermal 3-0 Vicryls were placed interrupted fashion at the left lower quadrant incision and then the skin incisions were closed using 4-0 Vicryl suture . Dermabond was then applied. The suresh was removed, the patient was awakened from general anesthesia without complication and then returned to the Postoperative Recovery Unit in stable condition. At the end of the case, sponge, instrument and needle counts were correct x 2. I was present and scrubbed throughout the entirety of the case.    Marcus Ruby Jr

## 2019-10-01 NOTE — PROGRESS NOTES
Pt on her side in fetal position, diaphoretic and shaking. When asked if she is OK, stated she is in 10/10 pain, she's never hurt like this before. Notified dr. Ortiz. Md ordered 1 mL of dilaudid, preceded by 12.5 mg of IV Benadryl. Will continue to monitor pt

## 2019-10-01 NOTE — DISCHARGE INSTRUCTIONS
Take your normal home pain medications for any pain you may experience. We will see you in office in 2 weeks for follow up.

## 2019-10-01 NOTE — PLAN OF CARE
Discharge order noted, No HH or DME noted.    Discharge rounds on patient. Discussed followup appointments, blue discharge folder, discharge nurse will go over home medications and reasons for medications and final discharge instructions. All patient/caregiver questions answered. Patient verbalized understanding.    Future Appointments   Date Time Provider Department Center   10/17/2019  1:40 PM Marcus Ruby Jr., MD [facility]          10/01/19 1011   Final Note   Assessment Type Final Discharge Note   Anticipated Discharge Disposition Home   What phone number can be called within the next 1-3 days to see how you are doing after discharge? [phone]   Hospital Follow Up  Appt(s) scheduled? Yes   Discharge plans and expectations educations in teach back method with documentation complete? Yes   Right Care Referral Info   Post Acute Recommendation No Care   Nisha Soto RN-BC  Transitional Navigator  [phone]

## 2019-10-01 NOTE — HOSPITAL COURSE
Patient admitted with acute appendicitis. Started on IV abx. Underwent lap appy hospital day 1 which she tolerated very well. Diet was advanced and patient was able to void. Good PO pain control and ambulating. Discharged home with close follow up.

## 2019-10-01 NOTE — PLAN OF CARE
Discharge orders noted. Additional clinical references attached.    Patient's discharge instructions given by bedside RN and reviewed via this VN.  Education provided on new medication, diagnosis, and follow-up appointments. Patient verbalized understanding. All questions answered. Transport to [facility] requested. Floor nurse notified.

## 2019-10-01 NOTE — PLAN OF CARE
Safety maintained, meds given per MAR. Pain helped with Tramadol. No N/V, SOb, distress. Temp relieved with Tylenol.

## 2019-10-03 ENCOUNTER — TELEPHONE (OUTPATIENT)
Dept: SURGERY | Facility: CLINIC | Age: 59
End: 2019-10-03

## 2019-10-03 NOTE — TELEPHONE ENCOUNTER
----- Message from Linda Leone sent at 10/3/2019 10:16 AM CDT -----  Contact: pt  Reason:Calling to inform Dr she is experiencing lots of flem and coughing to the point where it hurts her stomach to cough Please call to advise Thanks    Communication:  [phone]    10/3/19  10:22am  Spoke to patient regarding above message. I advised patient to hug a pillow while coughing to try and ease pain to incision site. Patient verbalized understanding.

## 2019-10-08 RX ORDER — LISINOPRIL AND HYDROCHLOROTHIAZIDE 12.5; 2 MG/1; MG/1
TABLET ORAL
Qty: 90 TABLET | Refills: 4 | Status: SHIPPED | OUTPATIENT
Start: 2019-10-08 | End: 2019-10-29 | Stop reason: SDUPTHER

## 2019-10-09 ENCOUNTER — TELEPHONE (OUTPATIENT)
Dept: SURGERY | Facility: CLINIC | Age: 59
End: 2019-10-09

## 2019-10-09 NOTE — TELEPHONE ENCOUNTER
----- Message from Rosalee Kuhn sent at 10/9/2019  1:54 PM CDT -----  Contact: pt  Pt would like to be called back regarding her surgery- feeling a a lot of pain     Pt can be reached at [phone]          10/09/2019          1419  Contacted patient regarding the above message. Patient stated that she is experiencing pain above her incision about 7-8/10. She is taking her medication and using ice packs for relief. Advised her to take ibuprofen in between the doses of pain medication. Patient stated she cannot take ibuprofen, and she can't use tylenol too frequent. Informed her that Dr. Ruby is in surgery, however, I would send the information to him and someone would contact her. Patient verbalized understanding.

## 2019-10-16 ENCOUNTER — PATIENT MESSAGE (OUTPATIENT)
Dept: NEUROSURGERY | Facility: CLINIC | Age: 59
End: 2019-10-16

## 2019-10-17 ENCOUNTER — OFFICE VISIT (OUTPATIENT)
Dept: SURGERY | Facility: CLINIC | Age: 59
End: 2019-10-17
Payer: COMMERCIAL

## 2019-10-17 VITALS
DIASTOLIC BLOOD PRESSURE: 87 MMHG | SYSTOLIC BLOOD PRESSURE: 137 MMHG | TEMPERATURE: 98 F | HEART RATE: 74 BPM | WEIGHT: 170.19 LBS | HEIGHT: 62 IN | BODY MASS INDEX: 31.32 KG/M2

## 2019-10-17 DIAGNOSIS — Z90.49 S/P LAPAROSCOPIC APPENDECTOMY: Primary | ICD-10-CM

## 2019-10-17 PROCEDURE — 99999 PR PBB SHADOW E&M-EST. PATIENT-LVL III: CPT | Mod: PBBFAC,,, | Performed by: SURGERY

## 2019-10-17 PROCEDURE — 99024 POSTOP FOLLOW-UP VISIT: CPT | Mod: S$GLB,,, | Performed by: SURGERY

## 2019-10-17 PROCEDURE — 99024 PR POST-OP FOLLOW-UP VISIT: ICD-10-PCS | Mod: S$GLB,,, | Performed by: SURGERY

## 2019-10-17 PROCEDURE — 99999 PR PBB SHADOW E&M-EST. PATIENT-LVL III: ICD-10-PCS | Mod: PBBFAC,,, | Performed by: SURGERY

## 2019-10-29 ENCOUNTER — OFFICE VISIT (OUTPATIENT)
Dept: FAMILY MEDICINE | Facility: CLINIC | Age: 59
End: 2019-10-29
Payer: COMMERCIAL

## 2019-10-29 VITALS
BODY MASS INDEX: 29.17 KG/M2 | OXYGEN SATURATION: 96 % | HEIGHT: 64 IN | DIASTOLIC BLOOD PRESSURE: 78 MMHG | TEMPERATURE: 98 F | HEART RATE: 71 BPM | WEIGHT: 170.88 LBS | SYSTOLIC BLOOD PRESSURE: 116 MMHG

## 2019-10-29 DIAGNOSIS — Z12.31 ENCOUNTER FOR SCREENING MAMMOGRAM FOR BREAST CANCER: ICD-10-CM

## 2019-10-29 DIAGNOSIS — E78.2 ELEVATED TRIGLYCERIDES WITH HIGH CHOLESTEROL: ICD-10-CM

## 2019-10-29 DIAGNOSIS — Z12.11 SCREEN FOR COLON CANCER: ICD-10-CM

## 2019-10-29 DIAGNOSIS — Z12.39 SCREENING FOR BREAST CANCER: ICD-10-CM

## 2019-10-29 DIAGNOSIS — K21.00 REFLUX ESOPHAGITIS: ICD-10-CM

## 2019-10-29 DIAGNOSIS — Z00.00 ROUTINE HEALTH MAINTENANCE: Primary | ICD-10-CM

## 2019-10-29 DIAGNOSIS — I10 ESSENTIAL HYPERTENSION: ICD-10-CM

## 2019-10-29 PROCEDURE — 3078F PR MOST RECENT DIASTOLIC BLOOD PRESSURE < 80 MM HG: ICD-10-PCS | Mod: CPTII,S$GLB,, | Performed by: FAMILY MEDICINE

## 2019-10-29 PROCEDURE — 3078F DIAST BP <80 MM HG: CPT | Mod: CPTII,S$GLB,, | Performed by: FAMILY MEDICINE

## 2019-10-29 PROCEDURE — 3074F PR MOST RECENT SYSTOLIC BLOOD PRESSURE < 130 MM HG: ICD-10-PCS | Mod: CPTII,S$GLB,, | Performed by: FAMILY MEDICINE

## 2019-10-29 PROCEDURE — 99396 PREV VISIT EST AGE 40-64: CPT | Mod: S$GLB,,, | Performed by: FAMILY MEDICINE

## 2019-10-29 PROCEDURE — 3074F SYST BP LT 130 MM HG: CPT | Mod: CPTII,S$GLB,, | Performed by: FAMILY MEDICINE

## 2019-10-29 PROCEDURE — 99396 PR PREVENTIVE VISIT,EST,40-64: ICD-10-PCS | Mod: S$GLB,,, | Performed by: FAMILY MEDICINE

## 2019-10-29 RX ORDER — LISINOPRIL AND HYDROCHLOROTHIAZIDE 12.5; 2 MG/1; MG/1
1 TABLET ORAL DAILY
Qty: 90 TABLET | Refills: 4 | Status: SHIPPED | OUTPATIENT
Start: 2019-10-29 | End: 2020-11-13

## 2019-10-29 RX ORDER — ALPRAZOLAM 0.5 MG/1
0.5 TABLET ORAL DAILY PRN
Qty: 30 TABLET | Refills: 1 | Status: SHIPPED | OUTPATIENT
Start: 2019-10-29 | End: 2020-03-14

## 2019-10-29 RX ORDER — SUMATRIPTAN 50 MG/1
TABLET, FILM COATED ORAL
Qty: 9 TABLET | Refills: 1 | Status: SHIPPED | OUTPATIENT
Start: 2019-10-29

## 2019-11-02 LAB
CHOLEST SERPL-MCNC: 243 MG/DL
CHOLEST/HDLC SERPL: 2.4 (CALC)
HDLC SERPL-MCNC: 103 MG/DL
LDLC SERPL CALC-MCNC: 120 MG/DL (CALC)
NONHDLC SERPL-MCNC: 140 MG/DL (CALC)
TRIGL SERPL-MCNC: 94 MG/DL

## 2019-11-03 PROBLEM — I10 ESSENTIAL HYPERTENSION: Status: ACTIVE | Noted: 2019-11-03

## 2019-11-03 PROBLEM — K21.00 REFLUX ESOPHAGITIS: Status: ACTIVE | Noted: 2019-11-03

## 2019-11-03 PROBLEM — E78.2 ELEVATED TRIGLYCERIDES WITH HIGH CHOLESTEROL: Status: ACTIVE | Noted: 2019-11-03

## 2019-11-03 PROBLEM — K35.30 ACUTE APPENDICITIS WITH LOCALIZED PERITONITIS, WITHOUT PERFORATION, ABSCESS, OR GANGRENE: Status: RESOLVED | Noted: 2019-09-30 | Resolved: 2019-11-03

## 2019-11-04 NOTE — PROGRESS NOTES
" Patient ID: Gabby Dill is a 59 y.o. female.    Chief Complaint: Annual Exam    HPI      Gabby Dill is a 59 y.o. female. here for annual exam.   No current complaints.        Review of Symptoms  Answers for HPI/ROS submitted by the patient on 10/29/2019   activity change: No  unexpected weight change: No  neck pain: No  hearing loss: Yes  rhinorrhea: Yes  trouble swallowing: No  eye discharge: No  visual disturbance: No  chest tightness: No  wheezing: No  chest pain: No  palpitations: No  blood in stool: No  constipation: No  vomiting: No  diarrhea: No  polydipsia: No  polyuria: No  difficulty urinating: No  hematuria: No  menstrual problem: No  dysuria: No  joint swelling: No  arthralgias: Yes  headaches: Yes  weakness: No  confusion: No  dysphoric mood: No    Constitutional: Negative.    HENT: Negative.    Eyes: Negative.    Respiratory: Negative.    Cardiovascular: Negative.    Gastrointestinal: Negative.    Endocrine: Negative.    Genitourinary: Negative.    Musculoskeletal: Negative.    Skin: Negative.    Allergic/Immunologic: Negative.    Neurological: Negative.    Hematological: Negative.    Psychiatric/Behavioral: Negative.      Except as above in HPI      Vitals:    10/29/19 0925   BP: 116/78   Pulse: 71   Temp: 98.2 °F (36.8 °C)   SpO2: 96%   Weight: 77.5 kg (170 lb 13.7 oz)   Height: 5' 4" (1.626 m)        Physical  Exam      Constitutional:  Oriented to person, place, and time. Appears well-developed and well-nourished.     HENT:   Head: Normocephalic and atraumatic.     Right Ear: Tympanic membrane, ear canal and External ear normal     Left Ear: Tympanic membrane, ear canal and External ear normal     Nose: Nose normal. No rhinorrhea or nasal deformity.     Mouth/Throat: Uvula is midline, oropharynx is clear and moist and mucous membranes are normal.      Eyes: Conjunctivae are normal. Right eye exhibits no discharge. Left eye exhibits no discharge. No scleral icterus.     Neck:  No " JVD present. No tracheal deviation  []  Neck supple.   []  No Carotid bruit    Cardiovascular:  Regular rate and rhythm with normal S1 and S2     Pulmonary/Chest:   Clear to auscultation bilaterally without wheezes, rhonchi or rales    Musculoskeletal: Normal range of motion. No edema or tenderness.   No deformity     Lymphadenopathy:  No cervical adenopathy.     Neurological:  Alert and oriented to person, place, and time. Coordination normal.     Skin: Skin is warm and dry. No rash noted.     Psychiatric: Normal mood and affect. Speech is normal and behavior is normal. Judgment and thought content normal.     Complete Blood Count  Lab Results   Component Value Date    RBC 4.53 10/01/2019    HGB 13.4 10/01/2019    HCT 40.1 10/01/2019    MCV 89 10/01/2019    MCH 29.6 10/01/2019    MCHC 33.4 10/01/2019    RDW 13.0 10/01/2019     10/01/2019    MPV 9.6 10/01/2019    GRAN 6.8 10/01/2019    GRAN 86.8 (H) 10/01/2019    LYMPH 0.5 (L) 10/01/2019    LYMPH 6.6 (L) 10/01/2019    MONO 0.5 10/01/2019    MONO 6.6 10/01/2019    EOS 0.0 10/01/2019    BASO 0.00 10/01/2019    EOSINOPHIL 0.0 10/01/2019    BASOPHIL 0.0 10/01/2019    DIFFMETHOD Automated 10/01/2019       Comprehensive Metabolic Panel  Lab Results   Component Value Date     (H) 09/30/2019    BUN 26 (H) 09/30/2019    CREATININE 0.53 09/30/2019     09/30/2019    K 3.7 09/30/2019     09/30/2019    PROT 6.7 09/30/2019    ALBUMIN 4.0 09/30/2019    BILITOT 0.8 09/30/2019    AST 25 09/30/2019    ALKPHOS 49 09/30/2019    CO2 23 09/30/2019    ALT 22 09/30/2019    ANIONGAP 9 09/30/2019    EGFRNONAA >60.0 09/30/2019    ESTGFRAFRICA >60.0 09/30/2019       TSH  No results found for: TSH    Assessment / Plan:      ICD-10-CM ICD-9-CM   1. Routine health maintenance Z00.00 V70.0   2. Elevated triglycerides with high cholesterol E78.2 272.2   3. Essential hypertension I10 401.9   4. Reflux esophagitis K21.0 530.11   5. Screening for breast cancer Z12.39 V76.10    6. Encounter for screening mammogram for breast cancer Z12.31 V76.12   7. Screen for colon cancer Z12.11 V76.51     Routine health maintenance    Elevated triglycerides with high cholesterol  -     Lipid panel; Future; Expected date: 10/29/2019    Essential hypertension    Reflux esophagitis    Screening for breast cancer    Encounter for screening mammogram for breast cancer  -     Mammo Digital Screening Bilat w/ Perez; Future; Expected date: 10/29/2019    Screen for colon cancer  -     Ambulatory referral to Gastroenterology    Other orders  -     ALPRAZolam (XANAX) 0.5 MG tablet; Take 1 tablet (0.5 mg total) by mouth daily as needed.  Dispense: 30 tablet; Refill: 1  -     lisinopril-hydrochlorothiazide (PRINZIDE,ZESTORETIC) 20-12.5 mg per tablet; Take 1 tablet by mouth once daily.  Dispense: 90 tablet; Refill: 4  -     sumatriptan (IMITREX) 50 MG tablet; One or two po daily for migraine headache  Dispense: 9 tablet; Refill: 1          Discussed how to stay healthy including: diet, exercise, refraining from smoking and discussed screening exams / tests needed for age, sex and family Hx.

## 2019-11-25 ENCOUNTER — HOSPITAL ENCOUNTER (OUTPATIENT)
Dept: RADIOLOGY | Facility: HOSPITAL | Age: 59
Discharge: HOME | End: 2019-11-25
Attending: FAMILY MEDICINE
Payer: COMMERCIAL

## 2019-11-25 DIAGNOSIS — Z12.31 ENCOUNTER FOR SCREENING MAMMOGRAM FOR BREAST CANCER: ICD-10-CM

## 2019-11-25 PROCEDURE — 77067 SCR MAMMO BI INCL CAD: CPT | Mod: TC,PO

## 2019-12-06 ENCOUNTER — TELEPHONE (OUTPATIENT)
Dept: FAMILY MEDICINE | Facility: CLINIC | Age: 59
End: 2019-12-06

## 2019-12-06 NOTE — TELEPHONE ENCOUNTER
----- Message from Imani Morris sent at 12/6/2019  9:49 AM CST -----  Contact: [phone]/self  Type:  Needs Medical Advice    Who Called:  self  Symptoms (please be specific):  Blood in her stool and her Gastro doctor is out of the office   How long has patient had these symptoms:  yesterday  Pharmacy name and phone #:   [id number] - [place], [place] - [address]  Would the patient rather a call back or a response via [facility]?    Best Call Back Number:     Additional Information:  She is on vacation and needs to talk with a doctor about it.      Can you please call her

## 2019-12-26 ENCOUNTER — TELEPHONE (OUTPATIENT)
Dept: FAMILY MEDICINE | Facility: CLINIC | Age: 59
End: 2019-12-26

## 2019-12-26 DIAGNOSIS — M54.30 SCIATIC LEG PAIN: Primary | ICD-10-CM

## 2019-12-26 NOTE — TELEPHONE ENCOUNTER
I spoke with the pt and scheduled her to see dr higgins 1/22/2020  She will await the procedure with dr clinton until after this appt with dr higgins

## 2019-12-26 NOTE — TELEPHONE ENCOUNTER
----- Message from Raudel Lopez sent at 12/26/2019  9:48 AM CST -----  Contact: [phone] / self   Patient would like a recommendation or referral for an orthopedic doctor. Please Advise.       I spoke with the pt   She is seeing dr clinton for a while now  She has had 2 back injections  The 2 nd injection  Caused burning in the lower back   And he then recommended that she has the    nerves burned    She then saw a chiropractor - the pain is worse  More like sciatic down the leg to the knee    She has concerns regarding this procedure.  She would like to know if you recommend a 2nd opinion first?  I advised ortho would not be the option - neuro surgeon?  Please place referral to [facility] if that is who she should see for this problem.  Send back to me and I can call her and scheduled this appt    She is aware dr galloway is not in the office today

## 2019-12-27 NOTE — TELEPHONE ENCOUNTER
[facility] Staff   Caller: Self [phone] (Today, 10:12 AM)             Patient is calling to talk to nurse in regards to the appointment with the Neurosurgeon in January.        I discussed MRi she had in november that Charlie ordered  I advised her to bring a copy of the report and the disc to her upcoming appt with dr higgins

## 2020-01-20 ENCOUNTER — PATIENT OUTREACH (OUTPATIENT)
Dept: ADMINISTRATIVE | Facility: OTHER | Age: 60
End: 2020-01-20

## 2020-01-22 ENCOUNTER — HOSPITAL ENCOUNTER (OUTPATIENT)
Dept: RADIOLOGY | Facility: HOSPITAL | Age: 60
Discharge: HOME | End: 2020-01-22
Attending: NEUROLOGICAL SURGERY
Payer: COMMERCIAL

## 2020-01-22 ENCOUNTER — OFFICE VISIT (OUTPATIENT)
Dept: NEUROSURGERY | Facility: CLINIC | Age: 60
End: 2020-01-22
Payer: COMMERCIAL

## 2020-01-22 VITALS — DIASTOLIC BLOOD PRESSURE: 81 MMHG | SYSTOLIC BLOOD PRESSURE: 124 MMHG | HEART RATE: 79 BPM

## 2020-01-22 DIAGNOSIS — M43.16 SPONDYLOLISTHESIS AT L4-L5 LEVEL: ICD-10-CM

## 2020-01-22 DIAGNOSIS — M48.061 FORAMINAL STENOSIS OF LUMBAR REGION: ICD-10-CM

## 2020-01-22 DIAGNOSIS — M43.16 SPONDYLOLISTHESIS AT L4-L5 LEVEL: Primary | ICD-10-CM

## 2020-01-22 PROCEDURE — 99999 PR PBB SHADOW E&M-EST. PATIENT-LVL III: ICD-10-PCS | Mod: PBBFAC,,, | Performed by: NEUROLOGICAL SURGERY

## 2020-01-22 PROCEDURE — 3079F DIAST BP 80-89 MM HG: CPT | Mod: CPTII,S$GLB,, | Performed by: NEUROLOGICAL SURGERY

## 2020-01-22 PROCEDURE — 72120 X-RAY BEND ONLY L-S SPINE: CPT | Mod: 26,59,, | Performed by: RADIOLOGY

## 2020-01-22 PROCEDURE — 72120 X-RAY BEND ONLY L-S SPINE: CPT | Mod: TC,PN,59

## 2020-01-22 PROCEDURE — 99999 PR PBB SHADOW E&M-EST. PATIENT-LVL III: CPT | Mod: PBBFAC,,, | Performed by: NEUROLOGICAL SURGERY

## 2020-01-22 PROCEDURE — 99205 OFFICE O/P NEW HI 60 MIN: CPT | Mod: S$GLB,,, | Performed by: NEUROLOGICAL SURGERY

## 2020-01-22 PROCEDURE — 99205 PR OFFICE/OUTPT VISIT, NEW, LEVL V, 60-74 MIN: ICD-10-PCS | Mod: S$GLB,,, | Performed by: NEUROLOGICAL SURGERY

## 2020-01-22 PROCEDURE — 72120 XR LUMBAR SPINE FLEXION AND EXTENSION ONLY: ICD-10-PCS | Mod: 26,59,, | Performed by: RADIOLOGY

## 2020-01-22 PROCEDURE — 3079F PR MOST RECENT DIASTOLIC BLOOD PRESSURE 80-89 MM HG: ICD-10-PCS | Mod: CPTII,S$GLB,, | Performed by: NEUROLOGICAL SURGERY

## 2020-01-22 PROCEDURE — 72082 X-RAY EXAM ENTIRE SPI 2/3 VW: CPT | Mod: TC,PN

## 2020-01-22 PROCEDURE — 3074F SYST BP LT 130 MM HG: CPT | Mod: CPTII,S$GLB,, | Performed by: NEUROLOGICAL SURGERY

## 2020-01-22 PROCEDURE — 72082 XR SCOLIOSIS COMPLETE: ICD-10-PCS | Mod: 26,,, | Performed by: RADIOLOGY

## 2020-01-22 PROCEDURE — 72082 X-RAY EXAM ENTIRE SPI 2/3 VW: CPT | Mod: 26,,, | Performed by: RADIOLOGY

## 2020-01-22 PROCEDURE — 3074F PR MOST RECENT SYSTOLIC BLOOD PRESSURE < 130 MM HG: ICD-10-PCS | Mod: CPTII,S$GLB,, | Performed by: NEUROLOGICAL SURGERY

## 2020-01-22 RX ORDER — GABAPENTIN 300 MG/1
300 CAPSULE ORAL 3 TIMES DAILY
COMMUNITY

## 2020-01-22 RX ORDER — BACLOFEN 10 MG/1
10 TABLET ORAL 3 TIMES DAILY
COMMUNITY
End: 2020-11-17

## 2020-01-22 NOTE — LETTER
January 22, 2020      Heidy Caro, DO  [address] [address]           [facility]  [address]  [facility] [address]  Phone: [phone]          Patient: Gabby Dill   MR Number: [id number]   YOB: 1960   Date of Visit: 1/22/2020       Dear Dr. Heidy Caro:    Thank you for referring Gabby Dill to me for evaluation. Attached you will find relevant portions of my assessment and plan of care.    If you have questions, please do not hesitate to call me. I look forward to following Gabby Dill along with you.    Sincerely,    Tashi Nair MD    Enclosure  CC:  No Recipients    If you would like to receive this communication electronically, please contact [email or web address] or [phone] to request more information on [email or web address] Link access.    For providers and/or their staff who would like to refer a patient to [facility], please contact us through our one-stop-shop provider referral line, [facility] , at [phone].    If you feel you have received this communication in error or would no longer like to receive these types of communications, please e-mail [email or web address]

## 2020-01-22 NOTE — PROGRESS NOTES
NEUROSURGICAL OUTPATIENT CONSULTATION NOTE    DATE OF SERVICE:  01/22/2020    ATTENDING PHYSICIAN:  Tashi Nair MD    CONSULT REQUESTED BY:  Heidy Caro DO    REASON FOR CONSULT:  Low back pain, right leg pain    SUBJECTIVE:    HISTORY OF PRESENT ILLNESS:  This is a very pleasant 59 y.o. female who has been complaining of worsening low back pain and right leg pain in the L4 distribution for more than 1 year.  She is a  taking care of children.  She has been complaining of low back pain when she stands or walk.  The low back pain and leg pain is interfering with her ability to stand or walk for prolonged period of time.  Affecting her quality of life and functional status.  She has been taking hydrocodone twice daily since May 2019.  She recently started gabapentin.  She had medial branch block with partial pain relief.  She had epidural steroid injection with good pain relief.  She is scheduled for medial branch radiofrequency ablation with pain management.  She has completed physical therapy but did not have significant pain relief.  No new onset of weakness or sphincter dysfunction.    Low Back Pain Scale  R Low Back-Pain Score: 3  R Low Back-Pain Intensity: Pain killers give very little relief from pain  R Low Back-Pain Score: I can look after myself normally but it causes extra pain  Low Back-Lifting: Pain prevents me from lifting heavy weights  but I can manage light weights if they are conveniently placed   Low Back-Walking: Pain does not prevent me from walking any distance   Low Back-Sitting: Pain prevents me from sitting more than 30 minutes   Low Back-Sleeping: I have pain in bed but it does not prevent me from sleeping well   Low Back-Social Life: My social life is normal but it increases the degree of pain   Low Back-Traveling: I have some pain when traveling but emma of my usual forms of travel make it any worse   Low Back-Changing Degree of Pain: My pain seems to be getting better  but improvement is slow         PAST MEDICAL HISTORY:  Active Ambulatory Problems     Diagnosis Date Noted    Elevated triglycerides with high cholesterol 2019    Essential hypertension 2019    Reflux esophagitis 2019     Resolved Ambulatory Problems     Diagnosis Date Noted    Acute appendicitis with localized peritonitis, without perforation, abscess, or gangrene 2019     Past Medical History:   Diagnosis Date    Arthritis     Colitis     Colitis     Hypertension        PAST SURGICAL HISTORY:  Past Surgical History:   Procedure Laterality Date    FACIAL FRACTURE SURGERY      LAPAROSCOPIC APPENDECTOMY N/A 10/1/2019    Procedure: APPENDECTOMY, LAPAROSCOPIC;  Surgeon: Marcus Ruby Jr., MD;  Location: [facility];  Service: General;  Laterality: N/A;    PLANTAR FASCIA SURGERY Bilateral        SOCIAL HISTORY:   Social History     Socioeconomic History    Marital status:      Spouse name: Not on file    Number of children: Not on file    Years of education: Not on file    Highest education level: Not on file   Occupational History    Not on file   Social Needs    Financial resource strain: Not on file    Food insecurity:     Worry: Not on file     Inability: Not on file    Transportation needs:     Medical: Not on file     Non-medical: Not on file   Tobacco Use    Smoking status: Former Smoker     Last attempt to quit: 1995     Years since quittin.0   Substance and Sexual Activity    Alcohol use: Yes     Frequency: Monthly or less     Drinks per session: 1 or 2     Binge frequency: Never     Comment: occasionally    Drug use: No    Sexual activity: Not on file   Lifestyle    Physical activity:     Days per week: Not on file     Minutes per session: Not on file    Stress: Not on file   Relationships    Social connections:     Talks on phone: Not on file     Gets together: Not on file     Attends Faith service: Not on file     Active member of  club or organization: Not on file     Attends meetings of clubs or organizations: Not on file     Relationship status: Not on file   Other Topics Concern    Not on file   Social History Narrative    Not on file       FAMILY HISTORY:  Family History   Problem Relation Age of Onset    Hypertension Mother     Cancer Father     Hypertension Father        CURRENTS MEDICATIONS:  Current Outpatient Medications on File Prior to Visit   Medication Sig Dispense Refill    ALPRAZolam (XANAX) 0.5 MG tablet Take 1 tablet (0.5 mg total) by mouth daily as needed. 30 tablet 1    baclofen (LIORESAL) 10 MG tablet Take 10 mg by mouth 3 (three) times daily.      estradiol (ESTRACE) 1 MG tablet Take 1 tablet by mouth once daily.      gabapentin (NEURONTIN) 300 MG capsule Take 300 mg by mouth 3 (three) times daily.      hydrocodone-acetaminophen 5-325mg (NORCO) 5-325 mg per tablet Take 1 tablet by mouth 3 (three) times daily as needed.       LIALDA 1.2 gram TbEC Take 1.2 g by mouth daily with breakfast.       lisinopril-hydrochlorothiazide (PRINZIDE,ZESTORETIC) 20-12.5 mg per tablet Take 1 tablet by mouth once daily. 90 tablet 4    methocarbamol (ROBAXIN) 750 MG Tab 750 mg 3 (three) times daily.       multivitamin (ONE DAILY MULTIVITAMIN) per tablet Take 1 tablet by mouth once daily.      progesterone (PROMETRIUM) 100 MG capsule Take 1 capsule by mouth once daily.      rabeprazole (ACIPHEX) 20 mg tablet Take 20 mg by mouth once daily.       sumatriptan (IMITREX) 50 MG tablet One or two po daily for migraine headache 9 tablet 1     No current facility-administered medications on file prior to visit.        ALLERGIES:  Review of patient's allergies indicates:   Allergen Reactions    Morphine Hives    Nsaids (non-steroidal anti-inflammatory drug) Other (See Comments)     GI bleed    Sulfa (sulfonamide antibiotics) Rash       REVIEW OF SYSTEMS:  Review of Systems   Constitutional: Negative for diaphoresis, fever and weight  loss.   Respiratory: Negative for shortness of breath.    Cardiovascular: Negative for chest pain.   Gastrointestinal: Negative for blood in stool.   Genitourinary: Negative for hematuria.   Endo/Heme/Allergies: Does not bruise/bleed easily.   All other systems reviewed and are negative.      OBJECTIVE:    PHYSICAL EXAMINATION:   Vitals:    01/22/20 0944   BP: 124/81   Pulse: 79       Physical Exam:  Vitals reviewed.    Constitutional: She appears well-developed and well-nourished.     Eyes: Pupils are equal, round, and reactive to light. Conjunctivae and EOM are normal.     Cardiovascular: Normal distal pulses and no edema.     Abdominal: Soft.     Skin: Skin displays no rash on trunk and no rash on extremities. Skin displays no lesions on trunk and no lesions on extremities.     Psych/Behavior: She is alert. She is oriented to person, place, and time. She has a normal mood and affect.     Musculoskeletal:        Neck: Range of motion is full.     Neurological:        DTRs: Tricep reflexes are 2+ on the right side and 2+ on the left side. Bicep reflexes are 2+ on the right side and 2+ on the left side. Brachioradialis reflexes are 2+ on the right side and 2+ on the left side. Patellar reflexes are 2+ on the right side and 2+ on the left side. Achilles reflexes are 2+ on the right side and 2+ on the left side.       Back Exam     Tenderness   The patient is experiencing tenderness in the lumbar.    Range of Motion   Extension: abnormal   Flexion: abnormal   Lateral bend right: abnormal   Lateral bend left: abnormal   Rotation right: abnormal   Rotation left: abnormal     Muscle Strength   Right Quadriceps:  5/5   Left Quadriceps:  5/5   Right Hamstrings:  5/5   Left Hamstrings:  5/5     Tests   Straight leg raise right: negative  Straight leg raise left: negative    Other   Toe walk: normal  Heel walk: normal            SI joint:   Palpation at the right and left SI joints not painful  AMANDA test is negative  bilaterally  Gaenslen test is negative bilaterally  Thigh thrust test is negative bilaterally    Neurologic Exam     Mental Status   Oriented to person, place, and time.   Speech: speech is normal   Level of consciousness: alert    Cranial Nerves   Cranial nerves II through XII intact.     CN III, IV, VI   Pupils are equal, round, and reactive to light.  Extraocular motions are normal.     Motor Exam   Muscle bulk: normal  Overall muscle tone: normal    Strength   Right deltoid: 5/5  Left deltoid: 5/5  Right biceps: 5/5  Left biceps: 5/5  Right triceps: 5/5  Left triceps: 5/5  Right wrist flexion: 5/5  Left wrist flexion: 5/5  Right wrist extension: 5/5  Left wrist extension: 5/5  Right interossei: 5/5  Left interossei: 5/5  Right iliopsoas: 5/5  Left iliopsoas: 5/5  Right quadriceps: 5/5  Left quadriceps: 5/5  Right hamstrin/5  Left hamstrin/5  Right anterior tibial: 5/5  Left anterior tibial: 5/5  Right posterior tibial: 5/5  Left posterior tibial: 5/5  Right peroneal: 5/5  Left peroneal: 5/5  Right gastroc: 5/5  Left gastroc: 5/5    Sensory Exam   Light touch normal.   Pinprick normal.     Gait, Coordination, and Reflexes     Gait  Gait: normal    Coordination   Finger to nose coordination: normal  Tandem walking coordination: normal    Reflexes   Right brachioradialis: 2+  Left brachioradialis: 2+  Right biceps: 2+  Left biceps: 2+  Right triceps: 2+  Left triceps: 2+  Right patellar: 2+  Left patellar: 2+  Right achilles: 2+  Left achilles: 2+  Right plantar: normal  Left plantar: normal  Right Basilio: absent  Left Basilio: absent  Right ankle clonus: absent  Left ankle clonus: absent        DIAGNOSTIC DATA:  I personally interpreted the following imaging:   Lumbar spine MRI 2019 shows grade 1 L4-5 degenerative disc disease with spondylolisthesis causing severe right foraminal stenosis    ASSESMENT:  This is a 59 y.o. female with     Problem List Items Addressed This Visit     None      Visit  Diagnoses     Spondylolisthesis at L4-L5 level    -  Primary    Relevant Orders    X-Ray Scoliosis Complete Including Supine And Erect    X-Ray Lumbar Spine Flexion And Extension Only    Foraminal stenosis of lumbar region        Relevant Orders    X-Ray Scoliosis Complete Including Supine And Erect    X-Ray Lumbar Spine Flexion And Extension Only          PLAN:  I explained the natural history of the disease and all treatment options. I recommended a left oblique interbody fusion with placement of the interbody spacer using allograft and posterior minimally invasive L4-5 instrumentation.  This will indirectly decompress the foraminal stenosis and stabilize the spondylolisthesis to improve patient symptoms and functional status.      We have discussed the risks of surgery including bleeding, infection, failure of surgery, CSF leak, nerve root injury, spinal cord injury, ureter injury, weakness, paralysis, peripheral neuropathy, malplaced hardware, migration of hardware, non-union, need for reoperation. Patient understands the risks and would like to proceed with surgery.      The patient has increased perioperative risks because of these comorbidities:  Essential hypertension.         Tashi Nair MD  Cell:[phone]

## 2020-01-24 ENCOUNTER — TELEPHONE (OUTPATIENT)
Dept: NEUROSURGERY | Facility: CLINIC | Age: 60
End: 2020-01-24

## 2020-01-24 DIAGNOSIS — M48.061 FORAMINAL STENOSIS OF LUMBAR REGION: ICD-10-CM

## 2020-01-24 DIAGNOSIS — Z98.1 S/P LUMBAR SPINAL FUSION: ICD-10-CM

## 2020-01-24 DIAGNOSIS — M43.16 SPONDYLOLISTHESIS AT L4-L5 LEVEL: Primary | ICD-10-CM

## 2020-01-24 DIAGNOSIS — M54.16 LUMBAR RADICULOPATHY: ICD-10-CM

## 2020-02-04 ENCOUNTER — TELEPHONE (OUTPATIENT)
Dept: NEUROSURGERY | Facility: CLINIC | Age: 60
End: 2020-02-04

## 2020-02-04 NOTE — TELEPHONE ENCOUNTER
----- Message from Zayra Brewster sent at 2/4/2020 10:29 AM CST -----  Contact: [phone]/self   Patient is requesting to speak with nurse regarding canceling her procedure on 05-26-20. Pt states she is not ready for it and will call back to reschedule. Please advise.

## 2020-02-04 NOTE — TELEPHONE ENCOUNTER
Spoke with , she states she's not ready to have the surgery at this time.once ready, she'll call back to schedule.     Scheduling and vendors notified.

## 2020-02-20 ENCOUNTER — TELEPHONE (OUTPATIENT)
Dept: NEUROSURGERY | Facility: CLINIC | Age: 60
End: 2020-02-20

## 2020-03-14 RX ORDER — ALPRAZOLAM 0.5 MG/1
TABLET ORAL
Qty: 30 TABLET | Refills: 1 | Status: SHIPPED | OUTPATIENT
Start: 2020-03-14 | End: 2020-08-20 | Stop reason: SDUPTHER

## 2020-06-18 ENCOUNTER — NURSE TRIAGE (OUTPATIENT)
Dept: ADMINISTRATIVE | Facility: CLINIC | Age: 60
End: 2020-06-18

## 2020-06-18 NOTE — TELEPHONE ENCOUNTER
Pt called with questions about covid 19. Pt stated she think she was exposed to someone with the virus. Pt stated she wanted to call the person who tested positive first because she heard the information second hand from her father. Pt cancelled triage.     Reason for Disposition   General information question, no triage required and triager able to answer question    Protocols used: INFORMATION ONLY CALL-A-AH

## 2020-06-22 ENCOUNTER — TELEPHONE (OUTPATIENT)
Dept: FAMILY MEDICINE | Facility: CLINIC | Age: 60
End: 2020-06-22

## 2020-06-22 NOTE — TELEPHONE ENCOUNTER
----- Message from Christian Bertrand sent at 6/22/2020 11:42 AM CDT -----  Regarding: bladder infection and Covid testing  Pt would like to be called back regarding bladder infection and Covid testing pt has been sneezing.    Pt can be reached at [phone].    Thank You.

## 2020-07-01 LAB
HUMAN PAPILLOMAVIRUS (HPV): NORMAL
HUMAN PAPILLOMAVIRUS (HPV): NORMAL

## 2020-07-16 ENCOUNTER — PATIENT OUTREACH (OUTPATIENT)
Dept: ADMINISTRATIVE | Facility: HOSPITAL | Age: 60
End: 2020-07-16

## 2020-08-19 ENCOUNTER — TELEPHONE (OUTPATIENT)
Dept: FAMILY MEDICINE | Facility: CLINIC | Age: 60
End: 2020-08-19

## 2020-08-19 NOTE — TELEPHONE ENCOUNTER
----- Message from Maddie Mason sent at 8/19/2020 10:35 AM CDT -----  Regarding: Requesting blood lab  Renetta from Dr. Shahram Benito office can be reached at [phone]. Fax [phone]  Renetta requesting results from blood labs please      Thank you!

## 2020-08-19 NOTE — TELEPHONE ENCOUNTER
I faxed dr mchugh office the last labs   Lipid 11/1/2019  Cbc 10/1/2019  Cbc ua lipase and cmp 9/30/2019

## 2020-08-20 RX ORDER — ALPRAZOLAM 0.5 MG/1
0.5 TABLET ORAL DAILY PRN
Qty: 30 TABLET | Refills: 1 | Status: SHIPPED | OUTPATIENT
Start: 2020-08-20 | End: 2020-11-17 | Stop reason: SDUPTHER

## 2020-08-20 NOTE — TELEPHONE ENCOUNTER
Nae ELIZALDE [facility] Staff   Caller: [phone] (Today, 10:22 AM)             Pt  CHRISSY BECERRIL calling regarding needing the Office to release Lab results to another Doctor office.  Pt is requesting a callback.     Please call and advise        FYI - I faxed the results yesterday but I can refax  ( see info below)

## 2020-08-20 NOTE — TELEPHONE ENCOUNTER
iLM for the pt that I faxed lab results yesterday ( see details below ) but I will refax again today    I advised pt to rtn call with any questions

## 2020-10-14 ENCOUNTER — PATIENT OUTREACH (OUTPATIENT)
Dept: FAMILY MEDICINE | Facility: CLINIC | Age: 60
End: 2020-10-14

## 2020-11-04 ENCOUNTER — PATIENT OUTREACH (OUTPATIENT)
Dept: ADMINISTRATIVE | Facility: HOSPITAL | Age: 60
End: 2020-11-04

## 2020-11-05 ENCOUNTER — PATIENT MESSAGE (OUTPATIENT)
Dept: FAMILY MEDICINE | Facility: CLINIC | Age: 60
End: 2020-11-05

## 2020-11-05 DIAGNOSIS — E78.2 ELEVATED TRIGLYCERIDES WITH HIGH CHOLESTEROL: ICD-10-CM

## 2020-11-05 DIAGNOSIS — Z00.00 ROUTINE HEALTH MAINTENANCE: Primary | ICD-10-CM

## 2020-11-05 DIAGNOSIS — I10 ESSENTIAL HYPERTENSION: ICD-10-CM

## 2020-11-05 DIAGNOSIS — R73.9 HYPERGLYCEMIA: ICD-10-CM

## 2020-11-06 ENCOUNTER — TELEPHONE (OUTPATIENT)
Dept: FAMILY MEDICINE | Facility: CLINIC | Age: 60
End: 2020-11-06

## 2020-11-10 ENCOUNTER — PATIENT MESSAGE (OUTPATIENT)
Dept: FAMILY MEDICINE | Facility: CLINIC | Age: 60
End: 2020-11-10

## 2020-11-13 LAB
ALBUMIN SERPL-MCNC: 4.2 G/DL (ref 3.6–5.1)
ALBUMIN/GLOB SERPL: 1.8 (CALC) (ref 1–2.5)
ALP SERPL-CCNC: 46 U/L (ref 37–153)
ALT SERPL-CCNC: 16 U/L (ref 6–29)
AST SERPL-CCNC: 13 U/L (ref 10–35)
BASOPHILS # BLD AUTO: 31 CELLS/UL (ref 0–200)
BASOPHILS NFR BLD AUTO: 0.5 %
BILIRUB SERPL-MCNC: 0.4 MG/DL (ref 0.2–1.2)
BUN SERPL-MCNC: 21 MG/DL (ref 7–25)
BUN/CREAT SERPL: ABNORMAL (CALC) (ref 6–22)
CALCIUM SERPL-MCNC: 9.3 MG/DL (ref 8.6–10.4)
CHLORIDE SERPL-SCNC: 104 MMOL/L (ref 98–110)
CHOLEST SERPL-MCNC: 203 MG/DL
CHOLEST/HDLC SERPL: 2.5 (CALC)
CO2 SERPL-SCNC: 26 MMOL/L (ref 20–32)
CREAT SERPL-MCNC: 0.76 MG/DL (ref 0.5–1.05)
EOSINOPHIL # BLD AUTO: 143 CELLS/UL (ref 15–500)
EOSINOPHIL NFR BLD AUTO: 2.3 %
ERYTHROCYTE [DISTWIDTH] IN BLOOD BY AUTOMATED COUNT: 12.6 % (ref 11–15)
GFRSERPLBLD MDRD-ARVRAT: 86 ML/MIN/1.73M2
GLOBULIN SER CALC-MCNC: 2.4 G/DL (CALC) (ref 1.9–3.7)
GLUCOSE SERPL-MCNC: 117 MG/DL (ref 65–99)
HBA1C MFR BLD: 5.8 % OF TOTAL HGB
HCT VFR BLD AUTO: 41.6 % (ref 35–45)
HDLC SERPL-MCNC: 82 MG/DL
HGB BLD-MCNC: 13.8 G/DL (ref 11.7–15.5)
LDLC SERPL CALC-MCNC: 98 MG/DL (CALC)
LYMPHOCYTES # BLD AUTO: 2139 CELLS/UL (ref 850–3900)
LYMPHOCYTES NFR BLD AUTO: 34.5 %
MCH RBC QN AUTO: 29.2 PG (ref 27–33)
MCHC RBC AUTO-ENTMCNC: 33.2 G/DL (ref 32–36)
MCV RBC AUTO: 87.9 FL (ref 80–100)
MONOCYTES # BLD AUTO: 707 CELLS/UL (ref 200–950)
MONOCYTES NFR BLD AUTO: 11.4 %
NEUTROPHILS # BLD AUTO: 3181 CELLS/UL (ref 1500–7800)
NEUTROPHILS NFR BLD AUTO: 51.3 %
NONHDLC SERPL-MCNC: 121 MG/DL (CALC)
PLATELET # BLD AUTO: 274 THOUSAND/UL (ref 140–400)
PMV BLD REES-ECKER: 9.9 FL (ref 7.5–12.5)
POTASSIUM SERPL-SCNC: 4.2 MMOL/L (ref 3.5–5.3)
PROT SERPL-MCNC: 6.6 G/DL (ref 6.1–8.1)
RBC # BLD AUTO: 4.73 MILLION/UL (ref 3.8–5.1)
SODIUM SERPL-SCNC: 140 MMOL/L (ref 135–146)
TRIGL SERPL-MCNC: 134 MG/DL
TSH SERPL-ACNC: 2.3 MIU/L (ref 0.4–4.5)
WBC # BLD AUTO: 6.2 THOUSAND/UL (ref 3.8–10.8)

## 2020-11-13 RX ORDER — LISINOPRIL AND HYDROCHLOROTHIAZIDE 12.5; 2 MG/1; MG/1
TABLET ORAL
Qty: 90 TABLET | Refills: 4 | Status: SHIPPED | OUTPATIENT
Start: 2020-11-13 | End: 2020-11-17 | Stop reason: SDUPTHER

## 2020-11-17 ENCOUNTER — OFFICE VISIT (OUTPATIENT)
Dept: FAMILY MEDICINE | Facility: CLINIC | Age: 60
End: 2020-11-17
Payer: COMMERCIAL

## 2020-11-17 VITALS
DIASTOLIC BLOOD PRESSURE: 74 MMHG | OXYGEN SATURATION: 98 % | TEMPERATURE: 99 F | SYSTOLIC BLOOD PRESSURE: 128 MMHG | WEIGHT: 178 LBS | HEIGHT: 64 IN | BODY MASS INDEX: 30.39 KG/M2 | HEART RATE: 72 BPM

## 2020-11-17 DIAGNOSIS — Z23 NEED FOR INFLUENZA VACCINATION: Primary | ICD-10-CM

## 2020-11-17 PROCEDURE — 90471 FLU VACCINE (QUAD) GREATER THAN OR EQUAL TO 3YO PRESERVATIVE FREE IM: ICD-10-PCS | Mod: S$GLB,,, | Performed by: FAMILY MEDICINE

## 2020-11-17 PROCEDURE — 1126F PR PAIN SEVERITY QUANTIFIED, NO PAIN PRESENT: ICD-10-PCS | Mod: S$GLB,,, | Performed by: FAMILY MEDICINE

## 2020-11-17 PROCEDURE — 3074F SYST BP LT 130 MM HG: CPT | Mod: CPTII,S$GLB,, | Performed by: FAMILY MEDICINE

## 2020-11-17 PROCEDURE — 90471 IMMUNIZATION ADMIN: CPT | Mod: S$GLB,,, | Performed by: FAMILY MEDICINE

## 2020-11-17 PROCEDURE — 90686 IIV4 VACC NO PRSV 0.5 ML IM: CPT | Mod: S$GLB,,, | Performed by: FAMILY MEDICINE

## 2020-11-17 PROCEDURE — 90686 FLU VACCINE (QUAD) GREATER THAN OR EQUAL TO 3YO PRESERVATIVE FREE IM: ICD-10-PCS | Mod: S$GLB,,, | Performed by: FAMILY MEDICINE

## 2020-11-17 PROCEDURE — 3074F PR MOST RECENT SYSTOLIC BLOOD PRESSURE < 130 MM HG: ICD-10-PCS | Mod: CPTII,S$GLB,, | Performed by: FAMILY MEDICINE

## 2020-11-17 PROCEDURE — 3008F PR BODY MASS INDEX (BMI) DOCUMENTED: ICD-10-PCS | Mod: CPTII,S$GLB,, | Performed by: FAMILY MEDICINE

## 2020-11-17 PROCEDURE — 3008F BODY MASS INDEX DOCD: CPT | Mod: CPTII,S$GLB,, | Performed by: FAMILY MEDICINE

## 2020-11-17 PROCEDURE — 99396 PR PREVENTIVE VISIT,EST,40-64: ICD-10-PCS | Mod: 25,S$GLB,, | Performed by: FAMILY MEDICINE

## 2020-11-17 PROCEDURE — 3078F PR MOST RECENT DIASTOLIC BLOOD PRESSURE < 80 MM HG: ICD-10-PCS | Mod: CPTII,S$GLB,, | Performed by: FAMILY MEDICINE

## 2020-11-17 PROCEDURE — 99396 PREV VISIT EST AGE 40-64: CPT | Mod: 25,S$GLB,, | Performed by: FAMILY MEDICINE

## 2020-11-17 PROCEDURE — 3078F DIAST BP <80 MM HG: CPT | Mod: CPTII,S$GLB,, | Performed by: FAMILY MEDICINE

## 2020-11-17 PROCEDURE — 1126F AMNT PAIN NOTED NONE PRSNT: CPT | Mod: S$GLB,,, | Performed by: FAMILY MEDICINE

## 2020-11-17 RX ORDER — LISINOPRIL AND HYDROCHLOROTHIAZIDE 12.5; 2 MG/1; MG/1
1 TABLET ORAL DAILY
Qty: 90 TABLET | Refills: 4 | Status: SHIPPED | OUTPATIENT
Start: 2020-11-17 | End: 2020-12-30 | Stop reason: SDUPTHER

## 2020-11-17 RX ORDER — ALPRAZOLAM 0.5 MG/1
0.5 TABLET ORAL DAILY PRN
Qty: 30 TABLET | Refills: 1 | Status: SHIPPED | OUTPATIENT
Start: 2020-11-17 | End: 2021-01-29 | Stop reason: SDUPTHER

## 2020-11-17 NOTE — PROGRESS NOTES
" Patient ID: Gabby Dill is a 60 y.o. female.    Chief Complaint: Annual Exam and wellness exam    HPI      Gabby Dill is a 60 y.o. female. here for annual exam.   No current complaints.        Review of Symptoms    Constitutional: Negative.    HENT: Negative.    Eyes: Negative.    Respiratory: Negative.    Cardiovascular: Negative.    Gastrointestinal: Negative.    Endocrine: Negative.    Genitourinary: Negative.    Musculoskeletal: Negative.    Skin: Negative.    Allergic/Immunologic: Negative.    Neurological: Negative.    Hematological: Negative.    Psychiatric/Behavioral: Negative.      Except as above in HPI      Vitals:    11/17/20 0931   BP: 128/74   BP Location: Left arm   Patient Position: Sitting   Pulse: 72   Temp: 98.6 °F (37 °C)   TempSrc: Oral   SpO2: 98%   Weight: 80.7 kg (178 lb 0.3 oz)   Height: 5' 4" (1.626 m)        Physical  Exam      Constitutional:  Oriented to person, place, and time. Appears well-developed and well-nourished.     HENT:   Head: Normocephalic and atraumatic.     Right Ear: Tympanic membrane, ear canal and External ear normal     Left Ear: Tympanic membrane, ear canal and External ear normal     Nose: Nose normal. No rhinorrhea or nasal deformity.     Mouth/Throat: Uvula is midline, oropharynx is clear and moist and mucous membranes are normal.      Eyes: Conjunctivae are normal. Right eye exhibits no discharge. Left eye exhibits no discharge. No scleral icterus.     Neck:  No JVD present. No tracheal deviation  [x]  Neck supple.   [x]  No Carotid bruit    Cardiovascular:  Regular rate and rhythm with normal S1 and S2     Pulmonary/Chest:   Clear to auscultation bilaterally without wheezes, rhonchi or rales    Musculoskeletal: Normal range of motion. No edema or tenderness.   No deformity     Lymphadenopathy:  No cervical adenopathy.     Neurological:  Alert and oriented to person, place, and time. Coordination normal.     Skin: Skin is warm and dry. No rash " noted.     Psychiatric: Normal mood and affect. Speech is normal and behavior is normal. Judgment and thought content normal.     Complete Blood Count  Lab Results   Component Value Date    RBC 4.73 11/12/2020    HGB 13.8 11/12/2020    HCT 41.6 11/12/2020    MCV 87.9 11/12/2020    MCH 29.2 11/12/2020    MCHC 33.2 11/12/2020    RDW 12.6 11/12/2020     11/12/2020    MPV 9.9 11/12/2020    GRAN 6.8 10/01/2019    GRAN 86.8 (H) 10/01/2019    LYMPH 2,139 11/12/2020    LYMPH 34.5 11/12/2020    MONO 707 11/12/2020    MONO 11.4 11/12/2020     11/12/2020    BASO 31 11/12/2020    EOSINOPHIL 2.3 11/12/2020    BASOPHIL 0.5 11/12/2020    DIFFMETHOD Automated 10/01/2019       Comprehensive Metabolic Panel  Lab Results   Component Value Date     (H) 11/12/2020    BUN 21 11/12/2020    CREATININE 0.76 11/12/2020     11/12/2020    K 4.2 11/12/2020     11/12/2020    PROT 6.6 11/12/2020    ALBUMIN 4.2 11/12/2020    BILITOT 0.4 11/12/2020    AST 13 11/12/2020    CO2 26 11/12/2020    ALT 16 11/12/2020    EGFRNONAA 86 11/12/2020    ESTGFRAFRICA 100 11/12/2020       TSH  Lab Results   Component Value Date    TSH 2.30 11/12/2020       Assessment / Plan:      ICD-10-CM ICD-9-CM   1. Need for influenza vaccination  Z23 V04.81     Need for influenza vaccination  -     Influenza - Quadrivalent *Preferred* (6 months+) (PF)    Other orders  -     ALPRAZolam (XANAX) 0.5 MG tablet; Take 1 tablet (0.5 mg total) by mouth daily as needed.  Dispense: 30 tablet; Refill: 1  -     lisinopriL-hydrochlorothiazide (PRINZIDE,ZESTORETIC) 20-12.5 mg per tablet; Take 1 tablet by mouth once daily.  Dispense: 90 tablet; Refill: 4          Discussed how to stay healthy including: diet, exercise, refraining from smoking and discussed screening exams / tests needed for age, sex and family Hx.  The 10-year ASCVD risk score ([place] WESTON Jr., et al., 2013) is: 2.7%    Values used to calculate the score:      Age: 60 years      Sex: Female       Is Non- : No      Diabetic: No      Tobacco smoker: No      Systolic Blood Pressure: 128 mmHg      Is BP treated: Yes      HDL Cholesterol: 82 mg/dL      Total Cholesterol: 203 mg/dL

## 2020-11-19 ENCOUNTER — TELEPHONE (OUTPATIENT)
Dept: FAMILY MEDICINE | Facility: CLINIC | Age: 60
End: 2020-11-19

## 2020-12-10 ENCOUNTER — PATIENT MESSAGE (OUTPATIENT)
Dept: FAMILY MEDICINE | Facility: CLINIC | Age: 60
End: 2020-12-10

## 2020-12-10 DIAGNOSIS — Z12.31 ENCOUNTER FOR SCREENING MAMMOGRAM FOR BREAST CANCER: Primary | ICD-10-CM

## 2020-12-30 RX ORDER — LISINOPRIL AND HYDROCHLOROTHIAZIDE 12.5; 2 MG/1; MG/1
1 TABLET ORAL DAILY
Qty: 90 TABLET | Refills: 4 | Status: SHIPPED | OUTPATIENT
Start: 2020-12-30 | End: 2021-01-29 | Stop reason: SDUPTHER

## 2021-01-04 ENCOUNTER — PATIENT MESSAGE (OUTPATIENT)
Dept: ADMINISTRATIVE | Facility: HOSPITAL | Age: 61
End: 2021-01-04

## 2021-01-07 ENCOUNTER — HOSPITAL ENCOUNTER (OUTPATIENT)
Dept: RADIOLOGY | Facility: HOSPITAL | Age: 61
Discharge: HOME | End: 2021-01-07
Attending: FAMILY MEDICINE
Payer: COMMERCIAL

## 2021-01-07 DIAGNOSIS — Z12.31 ENCOUNTER FOR SCREENING MAMMOGRAM FOR BREAST CANCER: ICD-10-CM

## 2021-01-07 PROCEDURE — 77067 SCR MAMMO BI INCL CAD: CPT | Mod: TC,PO

## 2021-01-29 RX ORDER — LISINOPRIL AND HYDROCHLOROTHIAZIDE 12.5; 2 MG/1; MG/1
1 TABLET ORAL DAILY
Qty: 90 TABLET | Refills: 4 | Status: SHIPPED | OUTPATIENT
Start: 2021-01-29 | End: 2022-02-21 | Stop reason: SDUPTHER

## 2021-01-29 RX ORDER — ALPRAZOLAM 0.5 MG/1
0.5 TABLET ORAL DAILY PRN
Qty: 30 TABLET | Refills: 1 | Status: SHIPPED | OUTPATIENT
Start: 2021-01-29

## 2021-02-09 ENCOUNTER — TELEPHONE (OUTPATIENT)
Dept: FAMILY MEDICINE | Facility: CLINIC | Age: 61
End: 2021-02-09

## 2022-01-10 ENCOUNTER — PATIENT MESSAGE (OUTPATIENT)
Dept: ADMINISTRATIVE | Facility: HOSPITAL | Age: 62
End: 2022-01-10
Payer: COMMERCIAL

## 2022-01-12 DIAGNOSIS — Z12.31 OTHER SCREENING MAMMOGRAM: ICD-10-CM

## 2022-02-21 RX ORDER — LISINOPRIL AND HYDROCHLOROTHIAZIDE 12.5; 2 MG/1; MG/1
1 TABLET ORAL DAILY
Qty: 15 TABLET | Refills: 1 | Status: SHIPPED | OUTPATIENT
Start: 2022-02-21 | End: 2022-07-29

## 2022-02-21 NOTE — TELEPHONE ENCOUNTER
Care Due:                  Date            Visit Type   Department     Provider  --------------------------------------------------------------------------------                                EP -                              PRIMARY      [facility] FAMILY  Last Visit: 11-      CARE ([facility])   MEDICINE       Harinder Galdamez  Next Visit: None Scheduled  None         None Found                                                            Last  Test          Frequency    Reason                     Performed    Due Date  --------------------------------------------------------------------------------    Office Visit  12 months..  lisinopriL-hydrochlorothi  11- 11-                             azide....................    CMP.........  12 months..  lisinopriL-hydrochlorothi  11- 11-                             azide....................    Powered by [employer]. Reference number: [id number].   2/21/2022 3:09:16 PM CST

## 2022-02-21 NOTE — TELEPHONE ENCOUNTER
----- Message from Sultana Guy sent at 2/21/2022  3:02 PM CST -----  Contact: [facility] [phone]  Type:  RX Refill Request    Who Called:  [facility]   Refill or New Rx: refill  RX Name and Strength: lisinopriL-hydrochlorothiazide (PRINZIDE,ZESTORETIC) 20-12.5 mg per tablet  How is the patient currently taking it? (ex. 1XDay): 1 x day   Is this a 30 day or 90 day RX: 90 days   Preferred Pharmacy with phone number: [facility] [phone] fax number [phone]  Local or Mail Order: mail  Ordering Provider: [facility]   Would the patient rather a call back or a response via [facility]?  N/a

## 2022-03-28 ENCOUNTER — OFFICE VISIT (OUTPATIENT)
Dept: OBSTETRICS AND GYNECOLOGY | Facility: CLINIC | Age: 62
End: 2022-03-28
Payer: COMMERCIAL

## 2022-03-28 VITALS — WEIGHT: 183 LBS | BODY MASS INDEX: 31.41 KG/M2 | SYSTOLIC BLOOD PRESSURE: 130 MMHG | DIASTOLIC BLOOD PRESSURE: 82 MMHG

## 2022-03-28 DIAGNOSIS — Z01.419 WELL WOMAN EXAM WITH ROUTINE GYNECOLOGICAL EXAM: Primary | ICD-10-CM

## 2022-03-28 PROCEDURE — 99999 PR PBB SHADOW E&M-EST. PATIENT-LVL III: ICD-10-PCS | Mod: PBBFAC,,, | Performed by: OBSTETRICS & GYNECOLOGY

## 2022-03-28 PROCEDURE — 3008F BODY MASS INDEX DOCD: CPT | Mod: CPTII,S$GLB,, | Performed by: OBSTETRICS & GYNECOLOGY

## 2022-03-28 PROCEDURE — 1159F MED LIST DOCD IN RCRD: CPT | Mod: CPTII,S$GLB,, | Performed by: OBSTETRICS & GYNECOLOGY

## 2022-03-28 PROCEDURE — 99386 PREV VISIT NEW AGE 40-64: CPT | Mod: S$GLB,,, | Performed by: OBSTETRICS & GYNECOLOGY

## 2022-03-28 PROCEDURE — 3008F PR BODY MASS INDEX (BMI) DOCUMENTED: ICD-10-PCS | Mod: CPTII,S$GLB,, | Performed by: OBSTETRICS & GYNECOLOGY

## 2022-03-28 PROCEDURE — 99386 PR PREVENTIVE VISIT,NEW,40-64: ICD-10-PCS | Mod: S$GLB,,, | Performed by: OBSTETRICS & GYNECOLOGY

## 2022-03-28 PROCEDURE — 3075F PR MOST RECENT SYSTOLIC BLOOD PRESS GE 130-139MM HG: ICD-10-PCS | Mod: CPTII,S$GLB,, | Performed by: OBSTETRICS & GYNECOLOGY

## 2022-03-28 PROCEDURE — 1160F PR REVIEW ALL MEDS BY PRESCRIBER/CLIN PHARMACIST DOCUMENTED: ICD-10-PCS | Mod: CPTII,S$GLB,, | Performed by: OBSTETRICS & GYNECOLOGY

## 2022-03-28 PROCEDURE — 99999 PR PBB SHADOW E&M-EST. PATIENT-LVL III: CPT | Mod: PBBFAC,,, | Performed by: OBSTETRICS & GYNECOLOGY

## 2022-03-28 PROCEDURE — 4010F ACE/ARB THERAPY RXD/TAKEN: CPT | Mod: CPTII,S$GLB,, | Performed by: OBSTETRICS & GYNECOLOGY

## 2022-03-28 PROCEDURE — 1159F PR MEDICATION LIST DOCUMENTED IN MEDICAL RECORD: ICD-10-PCS | Mod: CPTII,S$GLB,, | Performed by: OBSTETRICS & GYNECOLOGY

## 2022-03-28 PROCEDURE — 3075F SYST BP GE 130 - 139MM HG: CPT | Mod: CPTII,S$GLB,, | Performed by: OBSTETRICS & GYNECOLOGY

## 2022-03-28 PROCEDURE — 1160F RVW MEDS BY RX/DR IN RCRD: CPT | Mod: CPTII,S$GLB,, | Performed by: OBSTETRICS & GYNECOLOGY

## 2022-03-28 PROCEDURE — 3079F DIAST BP 80-89 MM HG: CPT | Mod: CPTII,S$GLB,, | Performed by: OBSTETRICS & GYNECOLOGY

## 2022-03-28 PROCEDURE — 4010F PR ACE/ARB THEARPY RXD/TAKEN: ICD-10-PCS | Mod: CPTII,S$GLB,, | Performed by: OBSTETRICS & GYNECOLOGY

## 2022-03-28 PROCEDURE — 88175 CYTOPATH C/V AUTO FLUID REDO: CPT | Performed by: OBSTETRICS & GYNECOLOGY

## 2022-03-28 PROCEDURE — 3079F PR MOST RECENT DIASTOLIC BLOOD PRESSURE 80-89 MM HG: ICD-10-PCS | Mod: CPTII,S$GLB,, | Performed by: OBSTETRICS & GYNECOLOGY

## 2022-03-28 NOTE — PROGRESS NOTES
CC: Annual check-up    SUBJECTIVE:   61 y.o. female   for annual routine Pap and checkup. No LMP recorded. Patient is postmenopausal..  She has no unusual complaints.    Off HRT  due to AUB    Past Medical History:   Diagnosis Date    Arthritis     back and tendenosis of shoulder  CTS    Colitis     Colitis     Hypertension    UC and diverticulosis  Past Surgical History:   Procedure Laterality Date    FACIAL FRACTURE SURGERY      LAPAROSCOPIC APPENDECTOMY N/A 10/1/2019    Procedure: APPENDECTOMY, LAPAROSCOPIC;  Surgeon: Marcus Ruby Jr., MD;  Location: [facility] OR;  Service: General;  Laterality: N/A;    PLANTAR FASCIA SURGERY Bilateral    D&C hysteroscopy x 2  Social History     Socioeconomic History    Marital status:    Tobacco Use    Smoking status: Former Smoker     Quit date: 1995     Years since quittin.2   Substance and Sexual Activity    Alcohol use: Yes     Comment: occasionally    Drug use: No     Family History   Problem Relation Age of Onset    Hypertension Mother     Cancer Father     Hypertension Father      OB History    Para Term  AB Living   2 2 2         SAB IAB Ectopic Multiple Live Births                  # Outcome Date GA Lbr Charles/2nd Weight Sex Delivery Anes PTL Lv   2 Term            1 Term                  Current Outpatient Medications   Medication Sig Dispense Refill    ALPRAZolam (XANAX) 0.5 MG tablet Take 1 tablet (0.5 mg total) by mouth daily as needed. 30 tablet 1    estradiol (ESTRACE) 1 MG tablet Take 1 tablet by mouth once daily.      gabapentin (NEURONTIN) 300 MG capsule Take 300 mg by mouth 3 (three) times daily.      hydrocodone-acetaminophen 5-325mg (NORCO) 5-325 mg per tablet Take 1 tablet by mouth 3 (three) times daily as needed.       LIALDA 1.2 gram TbEC Take 1.2 g by mouth daily with breakfast.       lisinopriL-hydrochlorothiazide (PRINZIDE,ZESTORETIC) 20-12.5 mg per tablet Take 1 tablet by mouth once  daily. 15 tablet 1    methocarbamol (ROBAXIN) 750 MG Tab 750 mg 3 (three) times daily.       multivitamin (ONE DAILY MULTIVITAMIN) per tablet Take 1 tablet by mouth once daily.      progesterone (PROMETRIUM) 100 MG capsule Take 1 capsule by mouth once daily.      rabeprazole (ACIPHEX) 20 mg tablet Take 20 mg by mouth once daily.       sumatriptan (IMITREX) 50 MG tablet One or two po daily for migraine headache 9 tablet 1     No current facility-administered medications for this visit.     Allergies: Morphine, Nsaids (non-steroidal anti-inflammatory drug), and Sulfa (sulfonamide antibiotics)     ROS:  Constitutional: no weight loss, weight gain, fever, fatigue  Eyes:  No vision changes, glasses/contacts  ENT/Mouth: No ulcers, sinus problems, ears ringing, headache  Cardiovascular: No inability to lie flat, chest pain, exercise intolerance, swelling, heart palpitations  Respiratory: No wheezing, coughing blood, shortness of breath, or cough  Gastrointestinal: No diarrhea, bloody stool, nausea/vomiting, constipation, gas, hemorrhoids  Genitourinary: No blood in urine, painful urination, urgency of urination, frequency of urination, incomplete emptying, incontinence, abnormal bleeding, painful periods, heavy periods, vaginal discharge, vaginal odor, painful intercourse, sexual problems, bleeding after intercourse.  Musculoskeletal: No muscle weakness  Skin/Breast: No painful breasts, nipple discharge, masses, rash, ulcers  Neurological: No passing out, seizures, numbness, headache  Endocrine: No diabetes, hypothyroid, hyperthyroid, hot flashes, hair loss, abnormal hair growth, ance  Psychiatric: No depression, crying  Hematologic: No bruises, bleeding, swollen lymph nodes, anemia.      OBJECTIVE:   The patient appears well, alert, oriented x 3, in no distress.  /82 (BP Location: Left arm, Patient Position: Sitting)   Wt 83 kg (183 lb)   BMI 31.41 kg/m²   NECK: no thyromegaly, trachea midline  SKIN: no acne,  striae, hirsutism  BREAST EXAM: breasts appear normal, no suspicious masses, no skin or nipple changes or axillary nodes  ABDOMEN: no hernias, masses, or hepatosplenomegaly  GENITALIA: normal external genitalia, no erythema, no discharge  URETHRA: normal urethra, normal urethral meatus  VAGINA: mucosal atrophy  CERVIX: no lesions or cervical motion tenderness  UTERUS: normal  ADNEXA: normal adnexa and no mass, fullness, tenderness      ASSESSMENT:   well woman  1. Well woman exam with routine gynecological exam        PLAN:   mammogram  pap smear  return annually or prn  Orders Placed This Encounter    Mammo Digital Screening Bilat w/ Perez    Liquid-Based Pap Smear, Screening     Wants to go to [facility] for mmg

## 2022-04-05 LAB
FINAL PATHOLOGIC DIAGNOSIS: NORMAL
Lab: NORMAL

## 2022-05-11 NOTE — TELEPHONE ENCOUNTER
Received request via: Patient    Was the patient seen in the last year in this department? Yes    Does the patient have an active prescription (recently filled or refills available) for medication(s) requested? No   See ct report in your basket on your view box

## 2022-05-31 ENCOUNTER — PATIENT MESSAGE (OUTPATIENT)
Dept: ADMINISTRATIVE | Facility: HOSPITAL | Age: 62
End: 2022-05-31
Payer: COMMERCIAL

## 2022-06-13 LAB — BCS RECOMMENDATION EXT: NORMAL

## 2022-08-24 ENCOUNTER — PATIENT MESSAGE (OUTPATIENT)
Dept: ADMINISTRATIVE | Facility: HOSPITAL | Age: 62
End: 2022-08-24
Payer: COMMERCIAL

## 2022-10-10 ENCOUNTER — PATIENT MESSAGE (OUTPATIENT)
Dept: ADMINISTRATIVE | Facility: HOSPITAL | Age: 62
End: 2022-10-10
Payer: COMMERCIAL

## 2022-12-05 ENCOUNTER — OFFICE VISIT (OUTPATIENT)
Dept: OBSTETRICS AND GYNECOLOGY | Facility: CLINIC | Age: 62
End: 2022-12-05
Payer: COMMERCIAL

## 2022-12-05 VITALS
DIASTOLIC BLOOD PRESSURE: 83 MMHG | WEIGHT: 176.13 LBS | SYSTOLIC BLOOD PRESSURE: 135 MMHG | BODY MASS INDEX: 30.24 KG/M2

## 2022-12-05 DIAGNOSIS — L72.3 SEBACEOUS CYST: Primary | ICD-10-CM

## 2022-12-05 PROCEDURE — 1159F PR MEDICATION LIST DOCUMENTED IN MEDICAL RECORD: ICD-10-PCS | Mod: CPTII,S$GLB,, | Performed by: OBSTETRICS & GYNECOLOGY

## 2022-12-05 PROCEDURE — 3075F PR MOST RECENT SYSTOLIC BLOOD PRESS GE 130-139MM HG: ICD-10-PCS | Mod: CPTII,S$GLB,, | Performed by: OBSTETRICS & GYNECOLOGY

## 2022-12-05 PROCEDURE — 99213 OFFICE O/P EST LOW 20 MIN: CPT | Mod: S$GLB,,, | Performed by: OBSTETRICS & GYNECOLOGY

## 2022-12-05 PROCEDURE — 1160F PR REVIEW ALL MEDS BY PRESCRIBER/CLIN PHARMACIST DOCUMENTED: ICD-10-PCS | Mod: CPTII,S$GLB,, | Performed by: OBSTETRICS & GYNECOLOGY

## 2022-12-05 PROCEDURE — 3008F BODY MASS INDEX DOCD: CPT | Mod: CPTII,S$GLB,, | Performed by: OBSTETRICS & GYNECOLOGY

## 2022-12-05 PROCEDURE — 1159F MED LIST DOCD IN RCRD: CPT | Mod: CPTII,S$GLB,, | Performed by: OBSTETRICS & GYNECOLOGY

## 2022-12-05 PROCEDURE — 99213 PR OFFICE/OUTPT VISIT, EST, LEVL III, 20-29 MIN: ICD-10-PCS | Mod: S$GLB,,, | Performed by: OBSTETRICS & GYNECOLOGY

## 2022-12-05 PROCEDURE — 4010F PR ACE/ARB THEARPY RXD/TAKEN: ICD-10-PCS | Mod: CPTII,S$GLB,, | Performed by: OBSTETRICS & GYNECOLOGY

## 2022-12-05 PROCEDURE — 99999 PR PBB SHADOW E&M-EST. PATIENT-LVL III: ICD-10-PCS | Mod: PBBFAC,,, | Performed by: OBSTETRICS & GYNECOLOGY

## 2022-12-05 PROCEDURE — 3079F PR MOST RECENT DIASTOLIC BLOOD PRESSURE 80-89 MM HG: ICD-10-PCS | Mod: CPTII,S$GLB,, | Performed by: OBSTETRICS & GYNECOLOGY

## 2022-12-05 PROCEDURE — 3075F SYST BP GE 130 - 139MM HG: CPT | Mod: CPTII,S$GLB,, | Performed by: OBSTETRICS & GYNECOLOGY

## 2022-12-05 PROCEDURE — 99999 PR PBB SHADOW E&M-EST. PATIENT-LVL III: CPT | Mod: PBBFAC,,, | Performed by: OBSTETRICS & GYNECOLOGY

## 2022-12-05 PROCEDURE — 4010F ACE/ARB THERAPY RXD/TAKEN: CPT | Mod: CPTII,S$GLB,, | Performed by: OBSTETRICS & GYNECOLOGY

## 2022-12-05 PROCEDURE — 1160F RVW MEDS BY RX/DR IN RCRD: CPT | Mod: CPTII,S$GLB,, | Performed by: OBSTETRICS & GYNECOLOGY

## 2022-12-05 PROCEDURE — 3008F PR BODY MASS INDEX (BMI) DOCUMENTED: ICD-10-PCS | Mod: CPTII,S$GLB,, | Performed by: OBSTETRICS & GYNECOLOGY

## 2022-12-05 PROCEDURE — 3079F DIAST BP 80-89 MM HG: CPT | Mod: CPTII,S$GLB,, | Performed by: OBSTETRICS & GYNECOLOGY

## 2022-12-05 RX ORDER — MUPIROCIN CALCIUM 20 MG/G
CREAM TOPICAL
Qty: 30 G | Refills: 0 | Status: SHIPPED | OUTPATIENT
Start: 2022-12-05 | End: 2023-12-05

## 2022-12-05 NOTE — PROGRESS NOTES
CC:  Chief Complaint   Patient presents with    Pain in vag area     After riding bike, pt stated concerns of ingrown hair bumps       HPI:    62 y.o.   OB History          2    Para   2    Term   2            AB        Living   2         SAB        IAB        Ectopic        Multiple        Live Births   2               Complaining of: discomfort on inner rt labia worse after bike riding, feels like something is poking in that location    (Not in a hospital admission)      Review of patient's allergies indicates:   Allergen Reactions    Morphine Hives    Nsaids (non-steroidal anti-inflammatory drug) Other (See Comments)     GI bleed    Sulfa (sulfonamide antibiotics) Rash        Past Medical History:   Diagnosis Date    Arthritis     back and tendenosis of shoulder  CTS    Colitis     Colitis     Hypertension      Past Surgical History:   Procedure Laterality Date    FACIAL FRACTURE SURGERY      LAPAROSCOPIC APPENDECTOMY N/A 10/1/2019    Procedure: APPENDECTOMY, LAPAROSCOPIC;  Surgeon: Marcus Ruby Jr., MD;  Location: [facility] OR;  Service: General;  Laterality: N/A;    PLANTAR FASCIA SURGERY Bilateral      Family History   Problem Relation Age of Onset    Hypertension Mother     Cancer Father     Hypertension Father      Social History     Tobacco Use    Smoking status: Former     Types: Cigarettes     Quit date: 1995     Years since quittin.9   Substance Use Topics    Alcohol use: Yes     Comment: occasionally    Drug use: No     ROS:  GENERAL: Feeling well overall. Denies fever or chills.   SKIN: Denies rash or lesions.   HEAD: Denies head injury or headache.   NODES: Denies enlarged lymph nodes.   CHEST: Denies chest pain or shortness of breath.   CARDIOVASCULAR: Denies palpitations or left sided chest pain.    ABDOMEN: Denies diarrhea, nausea, vomiting or rectal bleeding.   URINARY: No dysuria, hematuria, or burning on urination.  REPRODUCTIVE: See HPI.   BREASTS: Denies pain,  lumps, or nipple discharge.   HEMATOLOGIC: No easy bruisability or excessive bleeding.   MUSCULOSKELETAL: Denies joint pain or swelling.   NEUROLOGIC: Denies syncope or weakness.   PSYCHIATRIC: Denies depression, anxiety or mood swings.      PE: /83   Wt 79.9 kg (176 lb 2.4 oz)   BMI 30.24 kg/m²      APPEARANCE: Well nourished, well developed, in no acute distress.  SKIN: Normal skin turgor, no lesions.  NECK: Neck symmetric without masses or thyromegaly.  NODES: No inguinal, cervical, axillary or femoral lymph node enlargement.  CARDIOVASCULAR: Normal S1, S2. No rubs, murmurs or gallops.  NEUROLOGIC: Normal mood and affect. No depression or anxiety.   ABDOMEN: Soft. No tenderness or masses. No hepatosplenomegaly. No hernias.  RESPIRATORY: Normal respiratory effort with no retractions or use of accessory muscles.  BREASTS: Symmetrical, no skin changes or visible lesions. No palpable masses, nipple discharge, or adenopathy bilaterally.   BLADDER: Non-tender  GENITALIA: normal external genitalia, no erythema, no discharge  URETHRA: normal urethra, normal urethral meatus  VAGINA: 2 small sebaceous glands with dried secretions in duct of gland that is causing the stated discomfort, no erythema      ASSESSMENT/ PLAN    Gabby was seen today for pain in vag area.    Diagnoses and all orders for this visit:    Sebaceous cyst    Other orders  -     mupirocin calcium 2% (BACTROBAN) 2 % cream; Apply to affected area 3 times daily    Warm soaks and gentle massage to express dreied secretions  Apply bactroban after secretions expressed      Eduardo Owens MD

## 2023-03-24 NOTE — ED NOTES
Consents signed for transfer.  Pt resting quietly on stretcher.  Bed low and locked with Sr up and family at the bedside.  Instruct to call with problems, needs, or concerns.  Pt and spouse verbalized understanding.  Will continue to monitor   Topical Clindamycin Counseling: Patient counseled that this medication may cause skin irritation or allergic reactions.  In the event of skin irritation, the patient was advised to reduce the amount of the drug applied or use it less frequently.   The patient verbalized understanding of the proper use and possible adverse effects of clindamycin.  All of the patient's questions and concerns were addressed.

## 2023-05-02 DIAGNOSIS — E04.1 THYROID NODULE: Primary | ICD-10-CM

## 2023-05-11 ENCOUNTER — HOSPITAL ENCOUNTER (OUTPATIENT)
Dept: RADIOLOGY | Facility: HOSPITAL | Age: 63
Discharge: HOME | End: 2023-05-11
Attending: FAMILY MEDICINE
Payer: COMMERCIAL

## 2023-05-11 DIAGNOSIS — E04.1 THYROID NODULE: ICD-10-CM

## 2023-05-11 PROCEDURE — 76536 US EXAM OF HEAD AND NECK: CPT | Mod: TC,PO

## 2023-05-11 PROCEDURE — 76536 US EXAM OF HEAD AND NECK: CPT | Mod: 26,,, | Performed by: RADIOLOGY

## 2023-05-11 PROCEDURE — 76536 US THYROID: ICD-10-PCS | Mod: 26,,, | Performed by: RADIOLOGY

## 2023-06-20 ENCOUNTER — TELEPHONE (OUTPATIENT)
Dept: OBSTETRICS AND GYNECOLOGY | Facility: CLINIC | Age: 63
End: 2023-06-20
Payer: COMMERCIAL

## 2023-06-20 DIAGNOSIS — Z12.31 ENCOUNTER FOR SCREENING MAMMOGRAM FOR MALIGNANT NEOPLASM OF BREAST: Primary | ICD-10-CM

## 2023-06-20 NOTE — TELEPHONE ENCOUNTER
----- Message from Dorene Redman sent at 6/20/2023  9:12 AM CDT -----  Type:  Mammogram    Caller is requesting to schedule their annual mammogram appointment.  Order is not listed in EPIC.  Please enter order and contact patient to schedule.  Name of Caller: Gabby Dill  Where would they like the mammogram performed? [facility]  Would the patient rather a call back or a response via [facility]?  call  Best Call Back Number: [phone]  Additional Information:

## 2023-07-17 ENCOUNTER — OFFICE VISIT (OUTPATIENT)
Dept: OBSTETRICS AND GYNECOLOGY | Facility: CLINIC | Age: 63
End: 2023-07-17
Payer: COMMERCIAL

## 2023-07-17 VITALS — WEIGHT: 180 LBS | DIASTOLIC BLOOD PRESSURE: 88 MMHG | SYSTOLIC BLOOD PRESSURE: 155 MMHG | BODY MASS INDEX: 30.9 KG/M2

## 2023-07-17 DIAGNOSIS — N89.8 VAGINAL DRYNESS: ICD-10-CM

## 2023-07-17 DIAGNOSIS — Z01.419 WELL WOMAN EXAM WITH ROUTINE GYNECOLOGICAL EXAM: Primary | ICD-10-CM

## 2023-07-17 PROCEDURE — 99396 PREV VISIT EST AGE 40-64: CPT | Mod: S$GLB,,, | Performed by: OBSTETRICS & GYNECOLOGY

## 2023-07-17 PROCEDURE — 1159F PR MEDICATION LIST DOCUMENTED IN MEDICAL RECORD: ICD-10-PCS | Mod: CPTII,S$GLB,, | Performed by: OBSTETRICS & GYNECOLOGY

## 2023-07-17 PROCEDURE — 1160F RVW MEDS BY RX/DR IN RCRD: CPT | Mod: CPTII,S$GLB,, | Performed by: OBSTETRICS & GYNECOLOGY

## 2023-07-17 PROCEDURE — 3079F PR MOST RECENT DIASTOLIC BLOOD PRESSURE 80-89 MM HG: ICD-10-PCS | Mod: CPTII,S$GLB,, | Performed by: OBSTETRICS & GYNECOLOGY

## 2023-07-17 PROCEDURE — 88175 CYTOPATH C/V AUTO FLUID REDO: CPT

## 2023-07-17 PROCEDURE — 3077F PR MOST RECENT SYSTOLIC BLOOD PRESSURE >= 140 MM HG: ICD-10-PCS | Mod: CPTII,S$GLB,, | Performed by: OBSTETRICS & GYNECOLOGY

## 2023-07-17 PROCEDURE — 3008F BODY MASS INDEX DOCD: CPT | Mod: CPTII,S$GLB,, | Performed by: OBSTETRICS & GYNECOLOGY

## 2023-07-17 PROCEDURE — 3079F DIAST BP 80-89 MM HG: CPT | Mod: CPTII,S$GLB,, | Performed by: OBSTETRICS & GYNECOLOGY

## 2023-07-17 PROCEDURE — 1159F MED LIST DOCD IN RCRD: CPT | Mod: CPTII,S$GLB,, | Performed by: OBSTETRICS & GYNECOLOGY

## 2023-07-17 PROCEDURE — 99999 PR PBB SHADOW E&M-EST. PATIENT-LVL IV: ICD-10-PCS | Mod: PBBFAC,,, | Performed by: OBSTETRICS & GYNECOLOGY

## 2023-07-17 PROCEDURE — 3077F SYST BP >= 140 MM HG: CPT | Mod: CPTII,S$GLB,, | Performed by: OBSTETRICS & GYNECOLOGY

## 2023-07-17 PROCEDURE — 99999 PR PBB SHADOW E&M-EST. PATIENT-LVL IV: CPT | Mod: PBBFAC,,, | Performed by: OBSTETRICS & GYNECOLOGY

## 2023-07-17 PROCEDURE — 1160F PR REVIEW ALL MEDS BY PRESCRIBER/CLIN PHARMACIST DOCUMENTED: ICD-10-PCS | Mod: CPTII,S$GLB,, | Performed by: OBSTETRICS & GYNECOLOGY

## 2023-07-17 PROCEDURE — 3008F PR BODY MASS INDEX (BMI) DOCUMENTED: ICD-10-PCS | Mod: CPTII,S$GLB,, | Performed by: OBSTETRICS & GYNECOLOGY

## 2023-07-17 PROCEDURE — 99396 PR PREVENTIVE VISIT,EST,40-64: ICD-10-PCS | Mod: S$GLB,,, | Performed by: OBSTETRICS & GYNECOLOGY

## 2023-07-17 RX ORDER — OMEPRAZOLE 20 MG/1
20 CAPSULE, DELAYED RELEASE ORAL EVERY MORNING
COMMUNITY
Start: 2023-05-21

## 2023-07-17 NOTE — PROGRESS NOTES
CC: Annual check-up    SUBJECTIVE:   63 y.o. female   for annual routine Pap and checkup. No LMP recorded. Patient is postmenopausal..  She complains of vaginal dryness during intercourse. No attempted treatments. Denies painful sex, AUB, dysuria, vaginal discharge, vaginal itching.       Past Medical History:   Diagnosis Date    Arthritis     back and tendenosis of shoulder  CTS    Colitis     Colitis     Hypertension      Past Surgical History:   Procedure Laterality Date    FACIAL FRACTURE SURGERY      LAPAROSCOPIC APPENDECTOMY N/A 10/1/2019    Procedure: APPENDECTOMY, LAPAROSCOPIC;  Surgeon: Marcus Ruby Jr., MD;  Location: [facility] OR;  Service: General;  Laterality: N/A;    PLANTAR FASCIA SURGERY Bilateral      Social History     Socioeconomic History    Marital status:    Tobacco Use    Smoking status: Former     Types: Cigarettes     Quit date: 1995     Years since quittin.5   Substance and Sexual Activity    Alcohol use: Yes     Comment: occasionally    Drug use: No    Sexual activity: Yes     Family History   Problem Relation Age of Onset    Hypertension Mother     Cancer Father     Hypertension Father      OB History    Para Term  AB Living   2 2 2     2   SAB IAB Ectopic Multiple Live Births           2      # Outcome Date GA Lbr Charles/2nd Weight Sex Delivery Anes PTL Lv   2 Term      Vag-Spont   VANDANA   1 Term      Vag-Spont   VANDANA         Current Outpatient Medications   Medication Sig Dispense Refill    ALPRAZolam (XANAX) 0.5 MG tablet Take 1 tablet (0.5 mg total) by mouth daily as needed. 30 tablet 1    gabapentin (NEURONTIN) 300 MG capsule Take 300 mg by mouth 3 (three) times daily.      hydrocodone-acetaminophen 5-325mg (NORCO) 5-325 mg per tablet Take 1 tablet by mouth 3 (three) times daily as needed.       LIALDA 1.2 gram TbEC Take 1.2 g by mouth daily with breakfast.       lisinopriL-hydrochlorothiazide (PRINZIDE,ZESTORETIC) 20-12.5 mg per tablet TAKE 1  TABLET BY MOUTH ONCE A DAY 15 tablet 0    methocarbamol (ROBAXIN) 750 MG Tab 750 mg 3 (three) times daily.       multivitamin (THERAGRAN) per tablet Take 1 tablet by mouth once daily.      mupirocin calcium 2% (BACTROBAN) 2 % cream Apply to affected area 3 times daily 30 g 0    omeprazole (PRILOSEC) 20 MG capsule Take 20 mg by mouth every morning.      sumatriptan (IMITREX) 50 MG tablet One or two po daily for migraine headache 9 tablet 1    estradiol (ESTRACE) 1 MG tablet Take 1 tablet by mouth once daily.      progesterone (PROMETRIUM) 100 MG capsule Take 1 capsule by mouth once daily.      rabeprazole (ACIPHEX) 20 mg tablet Take 20 mg by mouth once daily.        No current facility-administered medications for this visit.     Allergies: Morphine, Nsaids (non-steroidal anti-inflammatory drug), and Sulfa (sulfonamide antibiotics)     ROS:  Constitutional: no weight loss, weight gain, fever, fatigue  Eyes:  No vision changes, glasses/contacts  ENT/Mouth: No ulcers, sinus problems, ears ringing, headache  Cardiovascular: No inability to lie flat, chest pain, exercise intolerance, swelling, heart palpitations  Respiratory: No wheezing, coughing blood, shortness of breath, or cough  Gastrointestinal: No diarrhea, bloody stool, nausea/vomiting, constipation, gas, hemorrhoids  Genitourinary: No blood in urine, painful urination, urgency of urination, frequency of urination, incomplete emptying, incontinence, abnormal bleeding, painful periods, heavy periods, vaginal discharge, vaginal odor, painful intercourse, sexual problems, bleeding after intercourse.  Musculoskeletal: No muscle weakness  Skin/Breast: No painful breasts, nipple discharge, masses, rash, ulcers  Neurological: No passing out, seizures, numbness, headache  Endocrine: No diabetes, hypothyroid, hyperthyroid, hot flashes, hair loss, abnormal hair growth, ance  Psychiatric: No depression, crying  Hematologic: No bruises, bleeding, swollen lymph nodes,  anemia.      OBJECTIVE:   The patient appears well, alert, oriented x 3, in no distress.  BP (!) 155/88   Wt 81.6 kg (180 lb)   BMI 30.90 kg/m²   NECK: no thyromegaly, trachea midline  SKIN: no acne, striae, hirsutism  BREAST EXAM: not examined  ABDOMEN: no hernias, masses, or hepatosplenomegaly  GENITALIA: normal external genitalia, no erythema, no discharge  URETHRA: normal urethra, normal urethral meatus  VAGINA: Normal  CERVIX: no lesions or cervical motion tenderness  UTERUS: normal  ADNEXA: normal adnexa      ASSESSMENT:   1. Well woman exam with routine gynecological exam    2. Vaginal dryness        PLAN:   Discussed options for tx of vaginal dryness including use of PRN lubricant v vaginal estrogen. Patient counseled on proper usage of lubrication during intercourse. Patient to return to clinic if she would like to pursue topical estrogen treatment.   mammogram  pap smear  return annually or prn  Orders Placed This Encounter    Mammo Digital Screening Bilat w/ Perez    Liquid-Based Pap Smear, Screening      Case discussed with Dr. Owens. Attestation to follow.     Dat Borrego DO   [facility] PGY-2

## 2023-07-24 LAB
FINAL PATHOLOGIC DIAGNOSIS: NORMAL
Lab: NORMAL

## 2023-08-11 LAB — BCS RECOMMENDATION EXT: NORMAL

## 2023-09-18 LAB — CRC RECOMMENDATION EXT: NORMAL

## 2024-04-22 ENCOUNTER — OFFICE VISIT (OUTPATIENT)
Dept: OBSTETRICS AND GYNECOLOGY | Facility: CLINIC | Age: 64
End: 2024-04-22
Payer: COMMERCIAL

## 2024-04-22 VITALS — WEIGHT: 182.31 LBS | DIASTOLIC BLOOD PRESSURE: 80 MMHG | BODY MASS INDEX: 31.3 KG/M2 | SYSTOLIC BLOOD PRESSURE: 147 MMHG

## 2024-04-22 DIAGNOSIS — N60.19 FIBROCYSTIC BREAST CHANGES, UNSPECIFIED LATERALITY: ICD-10-CM

## 2024-04-22 DIAGNOSIS — N64.4 BREAST PAIN, RIGHT: Primary | ICD-10-CM

## 2024-04-22 PROCEDURE — 3008F BODY MASS INDEX DOCD: CPT | Mod: CPTII,S$GLB,, | Performed by: OBSTETRICS & GYNECOLOGY

## 2024-04-22 PROCEDURE — 99999 PR PBB SHADOW E&M-EST. PATIENT-LVL III: CPT | Mod: PBBFAC,,, | Performed by: OBSTETRICS & GYNECOLOGY

## 2024-04-22 PROCEDURE — 1159F MED LIST DOCD IN RCRD: CPT | Mod: CPTII,S$GLB,, | Performed by: OBSTETRICS & GYNECOLOGY

## 2024-04-22 PROCEDURE — 99213 OFFICE O/P EST LOW 20 MIN: CPT | Mod: S$GLB,,, | Performed by: OBSTETRICS & GYNECOLOGY

## 2024-04-22 PROCEDURE — 3077F SYST BP >= 140 MM HG: CPT | Mod: CPTII,S$GLB,, | Performed by: OBSTETRICS & GYNECOLOGY

## 2024-04-22 PROCEDURE — 3079F DIAST BP 80-89 MM HG: CPT | Mod: CPTII,S$GLB,, | Performed by: OBSTETRICS & GYNECOLOGY

## 2024-04-22 PROCEDURE — 1160F RVW MEDS BY RX/DR IN RCRD: CPT | Mod: CPTII,S$GLB,, | Performed by: OBSTETRICS & GYNECOLOGY

## 2024-04-22 NOTE — PROGRESS NOTES
CC:  Chief Complaint   Patient presents with    Breast Pain       HPI:    63 y.o.   OB History          2    Para   2    Term   2            AB        Living   2         SAB        IAB        Ectopic        Multiple        Live Births   2               Complaining of: R breast pain, specially in nipple area that has been present for the past few weeks. Denies any distinct nodules/masses felt on self breast exams. She is concerned because she had Thyroid cancer s/p resection of mass on the same side. No reported signs of local lymph node invasion s/p resection. Patient is postmenopausal. She has no other unusual symptoms or concerns. Denies vaginal discharge, AUB, Dysuria, or any other vaginal/urinary symptoms. Of note, consistent on mammogram screening, getting imaging done at outside facility. Reports mammogram within the last year was reported to be normal.    Current Outpatient Medications   Medication Sig Dispense Refill    ALPRAZolam (XANAX) 0.5 MG tablet Take 1 tablet (0.5 mg total) by mouth daily as needed. 30 tablet 1    estradiol (ESTRACE) 1 MG tablet Take 1 tablet by mouth once daily.      gabapentin (NEURONTIN) 300 MG capsule Take 300 mg by mouth 3 (three) times daily.      hydrocodone-acetaminophen 5-325mg (NORCO) 5-325 mg per tablet Take 1 tablet by mouth 3 (three) times daily as needed.       LIALDA 1.2 gram TbEC Take 1.2 g by mouth daily with breakfast.       lisinopriL-hydrochlorothiazide (PRINZIDE,ZESTORETIC) 20-12.5 mg per tablet TAKE 1 TABLET BY MOUTH ONCE A DAY 15 tablet 0    methocarbamol (ROBAXIN) 750 MG Tab 750 mg 3 (three) times daily.       multivitamin (THERAGRAN) per tablet Take 1 tablet by mouth once daily.      omeprazole (PRILOSEC) 20 MG capsule Take 20 mg by mouth every morning.      progesterone (PROMETRIUM) 100 MG capsule Take 1 capsule by mouth once daily.      rabeprazole (ACIPHEX) 20 mg tablet Take 20 mg by mouth once daily.       sumatriptan (IMITREX) 50 MG tablet  One or two po daily for migraine headache 9 tablet 1     No current facility-administered medications for this visit.       Review of patient's allergies indicates:   Allergen Reactions    Morphine Hives    Nsaids (non-steroidal anti-inflammatory drug) Other (See Comments)     GI bleed    Sulfa (sulfonamide antibiotics) Rash        Past Medical History:   Diagnosis Date    Arthritis     back and tendenosis of shoulder  CTS    Colitis     Colitis     Hypertension      Past Surgical History:   Procedure Laterality Date    FACIAL FRACTURE SURGERY      LAPAROSCOPIC APPENDECTOMY N/A 10/1/2019    Procedure: APPENDECTOMY, LAPAROSCOPIC;  Surgeon: Marcus Ruby Jr., MD;  Location: [facility] OR;  Service: General;  Laterality: N/A;    PLANTAR FASCIA SURGERY Bilateral      Family History   Problem Relation Name Age of Onset    Hypertension Mother      Cancer Father      Hypertension Father       Social History     Tobacco Use    Smoking status: Former     Current packs/day: 0.00     Types: Cigarettes     Quit date: 1995     Years since quittin.3   Substance Use Topics    Alcohol use: Yes     Comment: occasionally    Drug use: No     ROS:  GENERAL: Feeling well overall. Denies fever or chills.   SKIN: Denies rash or lesions.   HEAD: Denies head injury or headache.   NODES: Denies enlarged lymph nodes.   CHEST: Denies chest pain or shortness of breath.   CARDIOVASCULAR: Denies palpitations or left sided chest pain.    ABDOMEN: Denies diarrhea, nausea, vomiting or rectal bleeding.   URINARY: No dysuria, hematuria, or burning on urination.  REPRODUCTIVE: See HPI.   BREASTS: Denies pain, lumps, or nipple discharge.   HEMATOLOGIC: No easy bruisability or excessive bleeding.   MUSCULOSKELETAL: Denies joint pain or swelling.   NEUROLOGIC: Denies syncope or weakness.   PSYCHIATRIC: Denies depression, anxiety or mood swings.      PE: BP (!) 147/80   Wt 82.7 kg (182 lb 5.1 oz)   BMI 31.30 kg/m²      APPEARANCE: Well  "nourished, well developed, in no acute distress.  SKIN: Normal skin turgor, no lesions.  NECK: Neck symmetric without masses or thyromegaly.  NODES: No inguinal, cervical, axillary or femoral lymph node enlargement.  CARDIOVASCULAR: Normal S1, S2. No rubs, murmurs or gallops.  NEUROLOGIC: Normal mood and affect. No depression or anxiety.   ABDOMEN: Soft. No tenderness or masses. No hepatosplenomegaly. No hernias.  RESPIRATORY: Normal respiratory effort with no retractions or use of accessory muscles.  BREASTS: Symmetrical, no skin changes or visible lesions. No palpable masses, nipple discharge, or adenopathy bilaterally. Tenderness to palp on R nipple area.  BLADDER: Non-tender    ASSESSMENT/ PLAN    Gabby Renteria" was seen today for breast pain.    Diagnoses and all orders for this visit:    Breast pain, right      Plan:  So concerns features on history and physical  Advised to get report on last mammogram  Otherwise routine f/u        Eduardo Owens MD     "

## 2024-06-11 ENCOUNTER — PATIENT OUTREACH (OUTPATIENT)
Dept: ADMINISTRATIVE | Facility: HOSPITAL | Age: 64
End: 2024-06-11
Payer: COMMERCIAL

## 2024-06-11 ENCOUNTER — OFFICE VISIT (OUTPATIENT)
Dept: FAMILY MEDICINE | Facility: CLINIC | Age: 64
End: 2024-06-11
Payer: COMMERCIAL

## 2024-06-11 VITALS
BODY MASS INDEX: 30.51 KG/M2 | TEMPERATURE: 98 F | SYSTOLIC BLOOD PRESSURE: 142 MMHG | HEART RATE: 77 BPM | OXYGEN SATURATION: 97 % | HEIGHT: 64 IN | WEIGHT: 178.69 LBS | DIASTOLIC BLOOD PRESSURE: 80 MMHG

## 2024-06-11 DIAGNOSIS — I10 ESSENTIAL HYPERTENSION: ICD-10-CM

## 2024-06-11 DIAGNOSIS — Z00.00 ROUTINE HEALTH MAINTENANCE: Primary | ICD-10-CM

## 2024-06-11 DIAGNOSIS — E78.2 ELEVATED TRIGLYCERIDES WITH HIGH CHOLESTEROL: ICD-10-CM

## 2024-06-11 DIAGNOSIS — K21.00 GASTROESOPHAGEAL REFLUX DISEASE WITH ESOPHAGITIS, UNSPECIFIED WHETHER HEMORRHAGE: ICD-10-CM

## 2024-06-11 PROCEDURE — 1159F MED LIST DOCD IN RCRD: CPT | Mod: CPTII,S$GLB,, | Performed by: FAMILY MEDICINE

## 2024-06-11 PROCEDURE — 3008F BODY MASS INDEX DOCD: CPT | Mod: CPTII,S$GLB,, | Performed by: FAMILY MEDICINE

## 2024-06-11 PROCEDURE — 3079F DIAST BP 80-89 MM HG: CPT | Mod: CPTII,S$GLB,, | Performed by: FAMILY MEDICINE

## 2024-06-11 PROCEDURE — 3077F SYST BP >= 140 MM HG: CPT | Mod: CPTII,S$GLB,, | Performed by: FAMILY MEDICINE

## 2024-06-11 PROCEDURE — 4010F ACE/ARB THERAPY RXD/TAKEN: CPT | Mod: CPTII,S$GLB,, | Performed by: FAMILY MEDICINE

## 2024-06-11 PROCEDURE — 1160F RVW MEDS BY RX/DR IN RCRD: CPT | Mod: CPTII,S$GLB,, | Performed by: FAMILY MEDICINE

## 2024-06-11 PROCEDURE — 99396 PREV VISIT EST AGE 40-64: CPT | Mod: S$GLB,,, | Performed by: FAMILY MEDICINE

## 2024-06-11 RX ORDER — HYDROCODONE BITARTRATE AND ACETAMINOPHEN 7.5; 325 MG/1; MG/1
1 TABLET ORAL EVERY 6 HOURS PRN
COMMUNITY

## 2024-06-11 RX ORDER — DULOXETIN HYDROCHLORIDE 30 MG/1
30 CAPSULE, DELAYED RELEASE ORAL DAILY
Qty: 90 CAPSULE | Refills: 3 | Status: SHIPPED | OUTPATIENT
Start: 2024-06-11 | End: 2025-06-11

## 2024-06-11 RX ORDER — LISINOPRIL AND HYDROCHLOROTHIAZIDE 12.5; 2 MG/1; MG/1
1 TABLET ORAL DAILY
Qty: 90 TABLET | Refills: 3 | Status: SHIPPED | OUTPATIENT
Start: 2024-06-11

## 2024-06-11 NOTE — PROGRESS NOTES
Population Health Chart Review & Patient Outreach Details      Additional [facility] Health Notes:      Mammogram uploaded to          Updates Requested / Reviewed:      Other    [facility]         Health Maintenance Topics Overdue:      VBHM Score: 4     Colon Cancer Screening  Mammogram  Uncontrolled BP  Hemoglobin A1c                       Health Maintenance Topic(s) Outreach Outcomes & Actions Taken:    Breast Cancer Screening - Outreach Outcomes & Actions Taken  : External Records Uploaded & Care Team Updated if Applicable

## 2024-06-11 NOTE — LETTER
AUTHORIZATION FOR RELEASE OF   CONFIDENTIAL INFORMATION    Diagnostic Imaging,    We are seeing Gabby Dill, date of birth 1960, in the clinic at [facility]. Harinder Galdamez MD is the patient's PCP. Gabby Dill has an outstanding lab/procedure at the time we reviewed her chart. In order to help keep her health information updated, she has authorized us to request the following medical record(s):        ( X )  MAMMOGRAM   and                             Please fax records to [facility], [place], Andrew J., [place], [phone]    If you have any questions, please contact Kate at [phone].           Patient Name: Gabby Dill  : 1960  Patient Phone #: [phone]

## 2024-06-11 NOTE — PROGRESS NOTES
Pt stated she has completed mammogram at [facility] in 2023. CHANTEL has been signed. Pt states she get mammorgrams with Dr. Waters. I have given Kate the mammogram result from 2022    Pt stated she completed colonoscopy from 2023 at Dr. Benito. CHANTEL сергей.

## 2024-06-12 ENCOUNTER — PATIENT OUTREACH (OUTPATIENT)
Dept: ADMINISTRATIVE | Facility: HOSPITAL | Age: 64
End: 2024-06-12
Payer: COMMERCIAL

## 2024-06-12 NOTE — PROGRESS NOTES
Population Health Chart Review & Patient Outreach Details      Additional [facility] Health Notes:      CHANTEL faxed for mammogram         Updates Requested / Reviewed:      Other    [facility]         Health Maintenance Topics Overdue:      VBHM Score: 4     Colon Cancer Screening  Mammogram  Uncontrolled BP  Hemoglobin A1c                       Health Maintenance Topic(s) Outreach Outcomes & Actions Taken:    Breast Cancer Screening - Outreach Outcomes & Actions Taken  : External Records Requested & Care Team Updated if Applicable

## 2024-06-12 NOTE — PROGRESS NOTES
Population Health Chart Review & Patient Outreach Details      Additional [facility] Health Notes:      CHANTEL faxed for colonoscopy         Updates Requested / Reviewed:      Other    [facility] / Dr Benito         Health Maintenance Topics Overdue:      VBHM Score: 4     Colon Cancer Screening  Mammogram  Uncontrolled BP  Hemoglobin A1c                       Health Maintenance Topic(s) Outreach Outcomes & Actions Taken:    Colorectal Cancer Screening - Outreach Outcomes & Actions Taken  : External Records Requested & Care Team Updated if Applicable

## 2024-06-17 ENCOUNTER — PATIENT MESSAGE (OUTPATIENT)
Dept: FAMILY MEDICINE | Facility: CLINIC | Age: 64
End: 2024-06-17
Payer: COMMERCIAL

## 2024-06-17 ENCOUNTER — PATIENT OUTREACH (OUTPATIENT)
Dept: ADMINISTRATIVE | Facility: HOSPITAL | Age: 64
End: 2024-06-17
Payer: COMMERCIAL

## 2024-06-17 NOTE — PROGRESS NOTES
" Patient ID: Gabby Dill is a 63 y.o. female.    Chief Complaint: Establish Care, Back Pain, and spot to back and face    HPI      Gabby Dill is a 63 y.o. female. here for annual exam.  Patient with elevated triglycerides hypertension and reflux esophagitis-all stable  No current complaints.        Review of Symptoms    Constitutional: Negative.    HENT: Negative.    Eyes: Negative.    Respiratory: Negative.    Cardiovascular: Negative.    Gastrointestinal: Negative.    Endocrine: Negative.    Genitourinary: Negative.    Musculoskeletal: Negative.    Skin: Negative.    Allergic/Immunologic: Negative.    Neurological: Negative.    Hematological: Negative.    Psychiatric/Behavioral: Negative.      Except as above in HPI      Vitals:    06/11/24 0951 06/11/24 1030   BP: (!) 142/78 (!) 142/80   BP Location: Right arm Right arm   Patient Position: Sitting Sitting   Pulse: 77    Temp: 98.1 °F (36.7 °C)    TempSrc: Oral    SpO2: 97%    Weight: 81.1 kg (178 lb 10.9 oz)    Height: 5' 4" (1.626 m)         Physical  Exam      Constitutional:  Oriented to person, place, and time. Appears well-developed and well-nourished.     HENT:   Head: Normocephalic and atraumatic.     Right Ear: Tympanic membrane, ear canal and External ear normal     Left Ear: Tympanic membrane, ear canal and External ear normal     Nose: Nose normal. No rhinorrhea or nasal deformity.     Mouth/Throat: Uvula is midline, oropharynx is clear and moist and mucous membranes are normal.      Eyes: Conjunctivae are normal. Right eye exhibits no discharge. Left eye exhibits no discharge. No scleral icterus.     Neck:  No JVD present. No tracheal deviation  []  Neck supple.   []  No Carotid bruit    Cardiovascular:  Regular rate and rhythm with normal S1 and S2     Pulmonary/Chest:   Clear to auscultation bilaterally without wheezes, rhonchi or rales    Musculoskeletal: Normal range of motion. No edema or tenderness.   No deformity " "    Lymphadenopathy:  No cervical adenopathy.     Neurological:  Alert and oriented to person, place, and time. Coordination normal.     Skin: Skin is warm and dry. No rash noted.     Psychiatric: Normal mood and affect. Speech is normal and behavior is normal. Judgment and thought content normal.     Complete Blood Count  Lab Results   Component Value Date    RBC 4.73 11/12/2020    HGB 13.8 11/12/2020    HCT 41.6 11/12/2020    MCV 87.9 11/12/2020    MCH 29.2 11/12/2020    MCHC 33.2 11/12/2020    RDW 12.6 11/12/2020     11/12/2020    MPV 9.9 11/12/2020    GRAN 6.8 10/01/2019    GRAN 86.8 (H) 10/01/2019    LYMPH 2,139 11/12/2020    LYMPH 34.5 11/12/2020    MONO 707 11/12/2020    MONO 11.4 11/12/2020     11/12/2020    BASO 31 11/12/2020    EOSINOPHIL 2.3 11/12/2020    BASOPHIL 0.5 11/12/2020    DIFFMETHOD Automated 10/01/2019       Comprehensive Metabolic Panel  No results found for: "GLU", "BUN", "CREATININE", "NA", "K", "CL", "PROT", "ALBUMIN", "BILITOT", "AST", "ALKPHOS", "CO2", "ALT", "ANIONGAP", "EGFRNONAA", "ESTGFRAFRICA"    TSH  No results found for: "TSH"    Assessment / Plan:      ICD-10-CM ICD-9-CM   1. Routine health maintenance  Z00.00 V70.0   2. Elevated triglycerides with high cholesterol  E78.2 272.2   3. Essential hypertension  I10 401.9   4. Gastroesophageal reflux disease with esophagitis, unspecified whether hemorrhage  K21.00 530.11     Routine health maintenance  -     Comprehensive Metabolic Panel; Future  -     CBC Auto Differential; Future  -     Lipid Panel; Future  -     Hemoglobin A1C; Future; Expected date: 06/11/2024    Elevated triglycerides with high cholesterol  -     Comprehensive Metabolic Panel; Future  -     CBC Auto Differential; Future  -     Lipid Panel; Future  -     Hemoglobin A1C; Future; Expected date: 06/11/2024    Essential hypertension  -     Comprehensive Metabolic Panel; Future  -     CBC Auto Differential; Future  -     Lipid Panel; Future  -     " Hemoglobin A1C; Future; Expected date: 06/11/2024    Gastroesophageal reflux disease with esophagitis, unspecified whether hemorrhage  -     Comprehensive Metabolic Panel; Future  -     CBC Auto Differential; Future  -     Lipid Panel; Future  -     Hemoglobin A1C; Future; Expected date: 06/11/2024    Other orders  -     DULoxetine (CYMBALTA) 30 MG capsule; Take 1 capsule (30 mg total) by mouth once daily.  Dispense: 90 capsule; Refill: 3  -     lisinopriL-hydrochlorothiazide (PRINZIDE,ZESTORETIC) 20-12.5 mg per tablet; Take 1 tablet by mouth once daily.  Dispense: 90 tablet; Refill: 3          Discussed how to stay healthy including: diet, and exercise.

## 2024-06-17 NOTE — PROGRESS NOTES
Population Health Chart Review & Patient Outreach Details      Additional [facility] Health Notes:      Mammogram updated in          Updates Requested / Reviewed:      Other    [facility]         Health Maintenance Topics Overdue:      VBHM Score: 4     Colon Cancer Screening  Mammogram  Uncontrolled BP  Hemoglobin A1c                       Health Maintenance Topic(s) Outreach Outcomes & Actions Taken:    Breast Cancer Screening - Outreach Outcomes & Actions Taken  : External Records Uploaded & Care Team Updated if Applicable

## 2024-06-24 ENCOUNTER — PATIENT MESSAGE (OUTPATIENT)
Dept: FAMILY MEDICINE | Facility: CLINIC | Age: 64
End: 2024-06-24
Payer: COMMERCIAL

## 2024-06-24 ENCOUNTER — PATIENT MESSAGE (OUTPATIENT)
Dept: OBSTETRICS AND GYNECOLOGY | Facility: CLINIC | Age: 64
End: 2024-06-24
Payer: COMMERCIAL

## 2024-06-25 ENCOUNTER — PATIENT MESSAGE (OUTPATIENT)
Dept: FAMILY MEDICINE | Facility: CLINIC | Age: 64
End: 2024-06-25

## 2024-06-27 ENCOUNTER — TELEPHONE (OUTPATIENT)
Dept: FAMILY MEDICINE | Facility: CLINIC | Age: 64
End: 2024-06-27
Payer: COMMERCIAL

## 2024-06-27 DIAGNOSIS — E11.9 TYPE 2 DIABETES MELLITUS WITHOUT COMPLICATION, WITHOUT LONG-TERM CURRENT USE OF INSULIN: ICD-10-CM

## 2024-06-27 DIAGNOSIS — R73.9 HYPERGLYCEMIA: Primary | ICD-10-CM

## 2024-06-27 NOTE — TELEPHONE ENCOUNTER
Patient is coming in tomorrow for a BP Check. She stated that bp has been elevated at home. She will bring her cuff with her to appt. She is also requesting that you review her labs from 6/26/24.    I've also printed out [facility] labs from 2023 to compare results.     Old labs have been placed in your view box.

## 2024-06-28 ENCOUNTER — TELEPHONE (OUTPATIENT)
Dept: FAMILY MEDICINE | Facility: CLINIC | Age: 64
End: 2024-06-28

## 2024-06-28 ENCOUNTER — CLINICAL SUPPORT (OUTPATIENT)
Dept: FAMILY MEDICINE | Facility: CLINIC | Age: 64
End: 2024-06-28
Payer: COMMERCIAL

## 2024-06-28 VITALS — SYSTOLIC BLOOD PRESSURE: 164 MMHG | DIASTOLIC BLOOD PRESSURE: 96 MMHG | OXYGEN SATURATION: 98 % | HEART RATE: 67 BPM

## 2024-06-28 DIAGNOSIS — E11.9 TYPE 2 DIABETES MELLITUS WITHOUT COMPLICATION, WITHOUT LONG-TERM CURRENT USE OF INSULIN: Primary | ICD-10-CM

## 2024-06-28 DIAGNOSIS — Z01.30 BP CHECK: Primary | ICD-10-CM

## 2024-06-28 RX ORDER — LISINOPRIL AND HYDROCHLOROTHIAZIDE 12.5; 2 MG/1; MG/1
2 TABLET ORAL DAILY
Qty: 180 TABLET | Refills: 3 | Status: SHIPPED | OUTPATIENT
Start: 2024-06-28

## 2024-06-28 RX ORDER — METFORMIN HYDROCHLORIDE 500 MG/1
500 TABLET, EXTENDED RELEASE ORAL
Qty: 90 TABLET | Refills: 3 | Status: SHIPPED | OUTPATIENT
Start: 2024-06-28 | End: 2025-06-28

## 2024-06-28 RX ORDER — INSULIN PUMP SYRINGE, 3 ML
EACH MISCELLANEOUS
Qty: 1 EACH | Refills: 0 | Status: SHIPPED | OUTPATIENT
Start: 2024-06-28

## 2024-06-28 RX ORDER — SEMAGLUTIDE 0.68 MG/ML
0.25 INJECTION, SOLUTION SUBCUTANEOUS
Qty: 3 ML | Refills: 1 | Status: SHIPPED | OUTPATIENT
Start: 2024-06-28

## 2024-06-28 RX ORDER — SEMAGLUTIDE 0.68 MG/ML
0.5 INJECTION, SOLUTION SUBCUTANEOUS
Qty: 9 EACH | Refills: 3 | Status: SHIPPED | OUTPATIENT
Start: 2024-07-26

## 2024-06-28 NOTE — TELEPHONE ENCOUNTER
Pt has been notified and verbalized understanding that she should now take Lisinopril HCT 20/12.5 mg two tablets daily. She will  new rx and keep BP check appt 7/11/24.

## 2024-06-28 NOTE — TELEPHONE ENCOUNTER
Pt came in for BP check.     1st BP: 142/92 P: 67 O2: 98%  2nd readin/96     Pt is currently taking Lisinopril HCT 20/12.5 mg daily.    She's scheduled to come back in 2 weeks for BP check.    Pharmacy: [facility]    Patient stated that she appreciated speaking with you regarding her labs today.     Pt's BP cuff gave a reading of 194/96. She will get a new cuff, as her machine is very outdated.

## 2024-06-28 NOTE — PROGRESS NOTES
Gabby M Candieruddykirillalexandro 64 y.o. female is here today for Blood Pressure check.   History of HTN yes.    Review of patient's allergies indicates:   Allergen Reactions    Morphine Hives    Naproxen      Other Reaction(s): stomach bleeding    Nsaids (non-steroidal anti-inflammatory drug) Other (See Comments)     GI bleed    Sulfa (sulfonamide antibiotics) Rash     Creatinine   Date Value Ref Range Status   06/26/2024 0.54 0.50 - 1.05 mg/dL Final     Sodium   Date Value Ref Range Status   06/26/2024 141 135 - 146 mmol/L Final     Potassium   Date Value Ref Range Status   06/26/2024 4.2 3.5 - 5.3 mmol/L Final   ]  Patient verifies taking blood pressure medications on a regular basis at the same time of the day.     Current Outpatient Medications:     ALPRAZolam (XANAX) 0.5 MG tablet, Take 1 tablet (0.5 mg total) by mouth daily as needed., Disp: 30 tablet, Rfl: 1    DULoxetine (CYMBALTA) 30 MG capsule, Take 1 capsule (30 mg total) by mouth once daily., Disp: 90 capsule, Rfl: 3    gabapentin (NEURONTIN) 300 MG capsule, Take 300 mg by mouth 3 (three) times daily., Disp: , Rfl:     HYDROcodone-acetaminophen (NORCO) 7.5-325 mg per tablet, Take 1 tablet by mouth every 6 (six) hours as needed for Pain., Disp: , Rfl:     LIALDA 1.2 gram TbEC, Take 1.2 g by mouth daily with breakfast. , Disp: , Rfl:     lisinopriL-hydrochlorothiazide (PRINZIDE,ZESTORETIC) 20-12.5 mg per tablet, Take 1 tablet by mouth once daily., Disp: 90 tablet, Rfl: 3    metFORMIN (GLUCOPHAGE-XR) 500 MG ER 24hr tablet, Take 1 tablet (500 mg total) by mouth daily with breakfast., Disp: 90 tablet, Rfl: 3    methocarbamol (ROBAXIN) 750 MG Tab, 750 mg 3 (three) times daily. , Disp: , Rfl:     multivitamin (THERAGRAN) per tablet, Take 1 tablet by mouth once daily., Disp: , Rfl:     omeprazole (PRILOSEC) 20 MG capsule, Take 20 mg by mouth every morning., Disp: , Rfl:     semaglutide (OZEMPIC) 0.25 mg or 0.5 mg (2 mg/3 mL) pen injector, Inject 0.25 mg into the skin every  7 days. Increase to 0.5 after two weeks., Disp: 3 mL, Rfl: 1    [START ON 7/26/2024] semaglutide (OZEMPIC) 0.25 mg or 0.5 mg (2 mg/3 mL) pen injector, Inject 0.5 mg into the skin every 7 days., Disp: 9 each, Rfl: 3    sumatriptan (IMITREX) 50 MG tablet, One or two po daily for migraine headache, Disp: 9 tablet, Rfl: 1  Does patient have record of home blood pressure readings yes. Readings have been averaging 150s/90s.   Last dose of blood pressure medication was taken at 7:30 am.  Patient is asymptomatic.       BP: (!) 164/96 , Pulse: 67 .    Blood pressure reading after 15 minutes was 164/96, Pulse 67.  Dr. Ji notified.

## 2024-06-29 PROBLEM — E11.9 TYPE 2 DIABETES MELLITUS WITHOUT COMPLICATION, WITHOUT LONG-TERM CURRENT USE OF INSULIN: Status: ACTIVE | Noted: 2024-06-29

## 2024-07-01 ENCOUNTER — PATIENT MESSAGE (OUTPATIENT)
Dept: FAMILY MEDICINE | Facility: CLINIC | Age: 64
End: 2024-07-01
Payer: COMMERCIAL

## 2024-07-02 VITALS — DIASTOLIC BLOOD PRESSURE: 73 MMHG | SYSTOLIC BLOOD PRESSURE: 117 MMHG

## 2024-07-09 ENCOUNTER — PATIENT MESSAGE (OUTPATIENT)
Dept: FAMILY MEDICINE | Facility: CLINIC | Age: 64
End: 2024-07-09
Payer: COMMERCIAL

## 2024-07-09 ENCOUNTER — TELEPHONE (OUTPATIENT)
Dept: FAMILY MEDICINE | Facility: CLINIC | Age: 64
End: 2024-07-09
Payer: COMMERCIAL

## 2024-07-09 NOTE — TELEPHONE ENCOUNTER
Called pt to get a fax number to fax over letter.    Pt states she will call back with a number or send portal message

## 2024-07-10 ENCOUNTER — CLINICAL SUPPORT (OUTPATIENT)
Dept: DIABETES | Facility: CLINIC | Age: 64
End: 2024-07-10
Payer: COMMERCIAL

## 2024-07-10 ENCOUNTER — PATIENT MESSAGE (OUTPATIENT)
Dept: OBSTETRICS AND GYNECOLOGY | Facility: CLINIC | Age: 64
End: 2024-07-10
Payer: COMMERCIAL

## 2024-07-10 DIAGNOSIS — E11.9 TYPE 2 DIABETES MELLITUS WITHOUT COMPLICATION, WITHOUT LONG-TERM CURRENT USE OF INSULIN: ICD-10-CM

## 2024-07-10 PROCEDURE — 99999 PR PBB SHADOW E&M-EST. PATIENT-LVL I: CPT | Mod: PBBFAC,,, | Performed by: DIETITIAN, REGISTERED

## 2024-07-10 PROCEDURE — G0108 DIAB MANAGE TRN  PER INDIV: HCPCS | Mod: S$GLB,,, | Performed by: DIETITIAN, REGISTERED

## 2024-07-11 ENCOUNTER — CLINICAL SUPPORT (OUTPATIENT)
Dept: FAMILY MEDICINE | Facility: CLINIC | Age: 64
End: 2024-07-11
Payer: COMMERCIAL

## 2024-07-11 VITALS — SYSTOLIC BLOOD PRESSURE: 125 MMHG | HEART RATE: 73 BPM | DIASTOLIC BLOOD PRESSURE: 79 MMHG

## 2024-07-11 DIAGNOSIS — E11.9 TYPE 2 DIABETES MELLITUS WITHOUT COMPLICATION, WITHOUT LONG-TERM CURRENT USE OF INSULIN: Primary | ICD-10-CM

## 2024-07-12 ENCOUNTER — PATIENT MESSAGE (OUTPATIENT)
Dept: DIABETES | Facility: CLINIC | Age: 64
End: 2024-07-12
Payer: COMMERCIAL

## 2024-07-15 NOTE — PROGRESS NOTES
Diabetes Care Specialist Progress Note  Author: Nany Cevallos RD  Date: 7/15/2024    Intake    Program Intake  Reason for Diabetes Program Visit:: Initial Diabetes Assessment  Current diabetes risk level:: low  In the last 12 months, have you:: none  Permission to speak with others about care:: no    Continuous Glucose Monitoring  Patient has CGM: No    Current Diabetes Treatment: Diet/Exercise  Diet/Exercise Type/Dose: Keto type diet      Patient is able to identify current diabetes medications, dosages, and appropriate timing of medications.: yes  Patient reports problems or concerns with current medication regimen.: no  Patient is  aware that some diabetes medications can cause low blood sugar?: No  Medication Skills Assessment Completed:: Yes  Assessment indicates:: Instruction Needed  Area of need?: Yes    Lab Results   Component Value Date    LABA1C 5.7 (H) 08/14/2018    HGBA1C 6.6 (H) 06/26/2024         Lifestyle Coping Support & Clinical    Lifestyle/Coping/Support  Does anyone in your family have diabetes or does anyone in your family support you in your diabetes care?: yes  List anything about Diabetes that causes you stress?: weight  Learning Barriers:: None  Culture or Restoration beliefs that may impact ability to access healthcare: No  Psychosocial/Coping Skills Assessment Completed: : Yes  Assessment indicates:: Instruction Needed  Area of need?: Yes    Problem Review  Active Comorbidities: Hyperlipidemia/Dyslipidemia, Hypertension    Diabetes Self-Management Skills Assessment    Diabetes Disease Process/Treatment Options  Diabetes Type?: Type II  When were you diagnosed?: recently; had prediabetes for a long time  What are your goals for this education session?: learn how to manage/get rid of it  Is patient aware of what causes diabetes?: Yes  Does patient understand the pathophysiology of diabetes?: No  Diabetes Disease Process/Treatment Options: Skills Assessment Completed: Yes  Assessment  indicates:: Instruction Needed  Area of need?: Yes    Nutrition/Healthy Eating  Meal Plan 24 Hour Recall - Breakfast: 2 eggs, meat  Meal Plan 24 Hour Recall - Lunch: turkey slices and cheese, avocado, nuts, pepperoni  Meal Plan 24 Hour Recall - Dinner: protein, salad/vegetables, and sometimes a lowcarb/Keto bun or wrap  Meal Plan 24 Hour Recall - Snack: nuts, cheese, deli meats  Meal Plan 24 Hour Recall - Beverage: water, coke 0, unsweet tea  Who shops/cooks?: pt and   Patient can identify foods that impact blood sugar.: yes  Challenges to healthy eating:: other (see comments) (none)  Nutrition/Healthy Eating Skills Assessment Completed:: Yes  Assessment indicates:: Instruction Needed  Area of need?: Yes    Physical Activity/Exercise  Patient's daily activity level:: lightly active  Patient formally exercises outside of work.: no  Reasons for not exercising:: other (see comments) (pt moves at work but also has some physical limitations d/t back/shoulder)  Patient can identify forms of physical activity.: yes  Physical Activity/Exercise Skills Assessment Completed: : Yes  Assessment indicates:: Instruction Needed  Area of need?: Yes    Home Blood Glucose Monitoring  Patient states that blood sugar is checked at home daily.: no  What is your A1c Target?: don't know  Home Blood Glucose Monitoring Skills Assessment Completed: : Yes  Assessment indicates:: Instruction Needed  Area of need?: Yes    Acute Complications  Acute Complications Skills Assessment Completed: : No  Deffered due to:: Time    Chronic Complications  Chronic Complications Skills Assessment Completed: : No  Deferred due to:: Time      Assessment Summary and Plan    Based on today's diabetes care assessment, the following areas of need were identified:          7/10/2024    12:01 AM   Areas of Need   Diabetes Disease Process/Treatment Options Yes, reviewed pathos and phys of DM2   Nutrition/Healthy Eating Yes, see care plan below   Physical  Activity/Exercise Yes, reviewed effects of exercise on BG; discussed frequency, duration and intensity recs   Medication Yes, reviewed MOA of DM meds   Home Blood Glucose Monitoring Yes, reviewed A1c and BG target ranges       Today's interventions were provided through individual discussion, instruction, and written materials were provided.      Patient verbalized understanding of instruction and written materials.  Pt was able to return back demonstration of instructions today. Patient understood key points, needs reinforcement and further instruction.     Diabetes Self-Management Care Plan:    Today's Diabetes Self-Management Care Plan was developed with Gabby's input. Gabby has agreed to work toward the following goal(s) to improve his/her overall diabetes control.      Care Plan: Diabetes Management   Updates made since 6/15/2024 12:00 AM        Problem: Healthy Eating         Goal: Eat 3 meals daily with 30-45g/2-3 servings of Carbohydrate per meal.    Start Date: 7/11/2024   Expected End Date: 7/31/2025   Priority: Medium   Barriers: No Barriers Identified   Note:    7/15/24: Pt eats mostly a keto diet with almost no carbs. She eats a lot of vegetables with a lot of processed meats. She is willing to add some carbs back into her diet. We concentrated on carbs high in fiber such as fruits. Encouraged to drink more water and to eliminate some of those fatty meats.        Task: Reviewed the sources and role of Carbohydrate, Protein, and Fat and how each nutrient impacts blood sugar. Completed 7/15/2024        Task: Provided visual examples using dry measuring cups, food models, and other familiar objects such as computer mouse, deck or cards, tennis ball etc. to help with visualization of portions. Completed 7/15/2024        Task: Explained how to count carbohydrates using the food label and the use of dry measuring cups for accurate carb counting. Completed 7/15/2024        Task: Discussed strategies for  choosing healthier menu options when dining out. Completed 7/15/2024        Task: Recommended replacing beverages containing high sugar content with noncaloric/sugar free options and/or water. Completed 7/15/2024        Task: Review the importance of balancing carbohydrates with each meal using portion control techniques to count servings of carbohydrate and label reading to identify serving size and amount of total carbs per serving. Completed 7/15/2024        Task: Provided Sample plate method and reviewed the use of the plate to estimate amounts of carbohydrate per meal. Completed 7/15/2024          Follow Up Plan     Follow up in about 9 weeks (around 9/11/2024). Assess dietary changes (addition of carbs); weight; assess for hypoglycemia; complications.    Today's care plan and follow up schedule was discussed with patient.  Gabby verbalized understanding of the care plan, goals, and agrees to follow up plan.        The patient was encouraged to communicate with his/her health care provider/physician and care team regarding his/her condition(s) and treatment.  I provided the patient with my contact information today and encouraged to contact me via phone or [facility] Patient Portal as needed.     Length of Visit   Total Time: 60 Minutes

## 2024-07-19 ENCOUNTER — TELEPHONE (OUTPATIENT)
Dept: FAMILY MEDICINE | Facility: CLINIC | Age: 64
End: 2024-07-19
Payer: COMMERCIAL

## 2024-07-19 NOTE — TELEPHONE ENCOUNTER
I have spoken to Caro from [facility] please addenum your office note and put the diagnosis of type 2 diabetes. Please give back so office notes can be faxed to [phone]    Case ID: [id number]     ----- Message from Swetha Williamson sent at 7/19/2024  4:19 PM CDT -----  Type:  Ins. Calling for Prior Auth      Pharmacy Name:[facility] ins.   Prescription Name:semaglutide (OZEMPIC) 0.25 mg or 0.5 mg (2 mg/3 mL) pen injector  Best Call Back Number:[phone] fax #:[phone]  Additional Information:   Agent requesting Polina Tong MA fax it over

## 2024-07-21 ENCOUNTER — PATIENT MESSAGE (OUTPATIENT)
Dept: FAMILY MEDICINE | Facility: CLINIC | Age: 64
End: 2024-07-21
Payer: COMMERCIAL

## 2024-07-24 ENCOUNTER — PATIENT OUTREACH (OUTPATIENT)
Dept: ADMINISTRATIVE | Facility: HOSPITAL | Age: 64
End: 2024-07-24
Payer: COMMERCIAL

## 2024-07-24 NOTE — PROGRESS NOTES
Population Health Chart Review & Patient Outreach Details      Additional [facility] Health Notes:      Efax sent for colonoscopy report. Pathology uploaded to media.         Updates Requested / Reviewed:      Other    [facility]         Health Maintenance Topics Overdue:      [facility] Score: 1     Colon Cancer Screening                       Health Maintenance Topic(s) Outreach Outcomes & Actions Taken:    Colorectal Cancer Screening - Outreach Outcomes & Actions Taken  : External Records Requested & Care Team Updated if Applicable

## 2024-07-24 NOTE — LETTER
AUTHORIZATION FOR RELEASE OF   CONFIDENTIAL INFORMATION    Cecy DOYLE    We are seeing Gabby Dill, date of birth 1960, in the clinic at [facility]. Harinder Galdamez MD is the patient's PCP. Gabby Dill has an outstanding lab/procedure at the time we reviewed her chart. In order to help keep her health information updated, she has authorized us to request the following medical record(s):                           ( X )  COLONOSCOPY            Please fax records to [facility], [place], Andrew J., [place], [phone]    If you have any questions, please contact Kate at [phone].             Patient Name: Gabby Dill  : 1960  Patient Phone #: [phone]

## 2024-07-26 ENCOUNTER — PATIENT OUTREACH (OUTPATIENT)
Dept: ADMINISTRATIVE | Facility: HOSPITAL | Age: 64
End: 2024-07-26
Payer: COMMERCIAL

## 2024-07-26 NOTE — PROGRESS NOTES
Population Health Chart Review & Patient Outreach Details      Additional [facility] Health Notes:      Colonoscopy updated in          Updates Requested / Reviewed:      Other    [facility]         Health Maintenance Topics Overdue:      [facility] Score: 1     Colon Cancer Screening                       Health Maintenance Topic(s) Outreach Outcomes & Actions Taken:    Colorectal Cancer Screening - Outreach Outcomes & Actions Taken  : External Records Uploaded, Care Team Updated, & History Updated if Applicable

## 2024-08-12 ENCOUNTER — PATIENT MESSAGE (OUTPATIENT)
Dept: OBSTETRICS AND GYNECOLOGY | Facility: CLINIC | Age: 64
End: 2024-08-12
Payer: COMMERCIAL

## 2024-08-12 LAB — BCS RECOMMENDATION EXT: NORMAL

## 2024-08-15 ENCOUNTER — OFFICE VISIT (OUTPATIENT)
Dept: OBSTETRICS AND GYNECOLOGY | Facility: CLINIC | Age: 64
End: 2024-08-15
Payer: COMMERCIAL

## 2024-08-15 VITALS
BODY MASS INDEX: 30.16 KG/M2 | WEIGHT: 175.69 LBS | SYSTOLIC BLOOD PRESSURE: 110 MMHG | DIASTOLIC BLOOD PRESSURE: 71 MMHG

## 2024-08-15 DIAGNOSIS — Z01.419 WELL WOMAN EXAM WITH ROUTINE GYNECOLOGICAL EXAM: Primary | ICD-10-CM

## 2024-08-15 PROCEDURE — 3044F HG A1C LEVEL LT 7.0%: CPT | Mod: CPTII,S$GLB,, | Performed by: OBSTETRICS & GYNECOLOGY

## 2024-08-15 PROCEDURE — 99999 PR PBB SHADOW E&M-EST. PATIENT-LVL III: CPT | Mod: PBBFAC,,, | Performed by: OBSTETRICS & GYNECOLOGY

## 2024-08-15 PROCEDURE — 3008F BODY MASS INDEX DOCD: CPT | Mod: CPTII,S$GLB,, | Performed by: OBSTETRICS & GYNECOLOGY

## 2024-08-15 PROCEDURE — 4010F ACE/ARB THERAPY RXD/TAKEN: CPT | Mod: CPTII,S$GLB,, | Performed by: OBSTETRICS & GYNECOLOGY

## 2024-08-15 PROCEDURE — 3078F DIAST BP <80 MM HG: CPT | Mod: CPTII,S$GLB,, | Performed by: OBSTETRICS & GYNECOLOGY

## 2024-08-15 PROCEDURE — 3074F SYST BP LT 130 MM HG: CPT | Mod: CPTII,S$GLB,, | Performed by: OBSTETRICS & GYNECOLOGY

## 2024-08-15 PROCEDURE — 1160F RVW MEDS BY RX/DR IN RCRD: CPT | Mod: CPTII,S$GLB,, | Performed by: OBSTETRICS & GYNECOLOGY

## 2024-08-15 PROCEDURE — 1159F MED LIST DOCD IN RCRD: CPT | Mod: CPTII,S$GLB,, | Performed by: OBSTETRICS & GYNECOLOGY

## 2024-08-15 PROCEDURE — 99396 PREV VISIT EST AGE 40-64: CPT | Mod: S$GLB,,, | Performed by: OBSTETRICS & GYNECOLOGY

## 2024-08-15 NOTE — PROGRESS NOTES
CC: Annual check-up    SUBJECTIVE:   64 y.o. female   for annual routine Pap and checkup. No LMP recorded. Patient is postmenopausal..  She has no unusual complaints and recently dx'd with T2 dm on metformin and doing better.    Did mmg at [facility] results are pending    Past Medical History:   Diagnosis Date    Arthritis     back and tendenosis of shoulder  CTS    Colitis     Colitis     Hypertension      Past Surgical History:   Procedure Laterality Date    APPENDECTOMY  10/01/19    FACIAL FRACTURE SURGERY      LAPAROSCOPIC APPENDECTOMY N/A 10/01/2019    Procedure: APPENDECTOMY, LAPAROSCOPIC;  Surgeon: Marcus Ruby Jr., MD;  Location: [facility] OR;  Service: General;  Laterality: N/A;    PLANTAR FASCIA SURGERY Bilateral     right shoulder      right thyroid       Social History     Socioeconomic History    Marital status:    Tobacco Use    Smoking status: Former     Current packs/day: 0.00     Types: Cigarettes     Quit date: 1995     Years since quittin.6     Passive exposure: Past   Substance and Sexual Activity    Alcohol use: Yes     Alcohol/week: 4.0 standard drinks of alcohol     Types: 2 Shots of liquor, 2 Drinks containing 0.5 oz of alcohol per week     Comment: occasionally    Drug use: No    Sexual activity: Yes     Partners: Male     Birth control/protection: Post-menopausal     Social Determinants of Health     Financial Resource Strain: Low Risk  (6/10/2024)    Overall Financial Resource Strain (CARDIA)     Difficulty of Paying Living Expenses: Not very hard   Food Insecurity: Unknown (6/10/2024)    Hunger Vital Sign     Ran Out of Food in the Last Year: Patient declined   Physical Activity: Unknown (6/10/2024)    Exercise Vital Sign     Days of Exercise per Week: 0 days   Stress: Stress Concern Present (6/10/2024)    [employer] - Occupational Stress Questionnaire     Feeling of Stress : Rather much   Housing Stability: Unknown (6/10/2024)     Housing Stability Vital Sign     Unable to Pay for Housing in the Last Year: Patient declined     Family History   Problem Relation Name Age of Onset    Hypertension Mother      Cancer Father      Hypertension Father      Arthritis Father      Hearing loss Father       OB History    Para Term  AB Living   2 2 2     2   SAB IAB Ectopic Multiple Live Births           2      # Outcome Date GA Lbr Charles/2nd Weight Sex Type Anes PTL Lv   2 Term      Vag-Spont   VANDANA   1 Term      Vag-Spont   VANDANA         Current Outpatient Medications   Medication Sig Dispense Refill    ALPRAZolam (XANAX) 0.5 MG tablet Take 1 tablet (0.5 mg total) by mouth daily as needed. 30 tablet 1    blood-glucose meter kit 1- Name of Meter: Insurance preferred meter. 2- Name of Test Strip: Insurance preferred test strips. 3- Frequency of Use: twice daily. 4- ICD 10 Code: E11.9. 5- Specific Quantity for 90 day supply: 90 day supply. 6- Refills: 3. 7- Lancets: 90 day supply 1 each 0    DULoxetine (CYMBALTA) 30 MG capsule Take 1 capsule (30 mg total) by mouth once daily. 90 capsule 3    gabapentin (NEURONTIN) 300 MG capsule Take 300 mg by mouth 3 (three) times daily.      HYDROcodone-acetaminophen (NORCO) 7.5-325 mg per tablet Take 1 tablet by mouth every 6 (six) hours as needed for Pain.      LIALDA 1.2 gram TbEC Take 1.2 g by mouth daily with breakfast.       lisinopriL-hydrochlorothiazide (PRINZIDE,ZESTORETIC) 20-12.5 mg per tablet Take 2 tablets by mouth once daily. 180 tablet 3    metFORMIN (GLUCOPHAGE-XR) 500 MG ER 24hr tablet Take 1 tablet (500 mg total) by mouth daily with breakfast. 90 tablet 3    methocarbamol (ROBAXIN) 750 MG Tab 750 mg 3 (three) times daily.       multivitamin (THERAGRAN) per tablet Take 1 tablet by mouth once daily.      omeprazole (PRILOSEC) 20 MG capsule Take 20 mg by mouth every morning.      semaglutide (OZEMPIC) 0.25 mg or 0.5 mg (2 mg/3 mL) pen injector Inject 0.25 mg into the skin every 7 days. Increase to  0.5 after two weeks. 3 mL 1    semaglutide (OZEMPIC) 0.25 mg or 0.5 mg (2 mg/3 mL) pen injector Inject 0.5 mg into the skin every 7 days. 9 each 3    sumatriptan (IMITREX) 50 MG tablet One or two po daily for migraine headache (Patient not taking: Reported on 8/15/2024) 9 tablet 1     No current facility-administered medications for this visit.     Allergies: Morphine, Naproxen, Nsaids (non-steroidal anti-inflammatory drug), and Sulfa (sulfonamide antibiotics)     ROS:  Constitutional: no weight loss, weight gain, fever, fatigue  Eyes:  No vision changes, glasses/contacts  ENT/Mouth: No ulcers, sinus problems, ears ringing, headache  Cardiovascular: No inability to lie flat, chest pain, exercise intolerance, swelling, heart palpitations  Respiratory: No wheezing, coughing blood, shortness of breath, or cough  Gastrointestinal: No diarrhea, bloody stool, nausea/vomiting, constipation, gas, hemorrhoids  Genitourinary: No blood in urine, painful urination, urgency of urination, frequency of urination, incomplete emptying, incontinence, abnormal bleeding, painful periods, heavy periods, vaginal discharge, vaginal odor, painful intercourse, sexual problems, bleeding after intercourse.  Musculoskeletal: No muscle weakness  Skin/Breast: No painful breasts, nipple discharge, masses, rash, ulcers  Neurological: No passing out, seizures, numbness, headache  Endocrine: No diabetes, hypothyroid, hyperthyroid, hot flashes, hair loss, abnormal hair growth, ance  Psychiatric: No depression, crying  Hematologic: No bruises, bleeding, swollen lymph nodes, anemia.      OBJECTIVE:   The patient appears well, alert, oriented x 3, in no distress.  /71   Wt 79.7 kg (175 lb 11.3 oz)   BMI 30.16 kg/m²   NECK: no thyromegaly, trachea midline  SKIN: no acne, striae, hirsutism  BREAST EXAM: breasts appear normal, no suspicious masses, no skin or nipple changes or axillary nodes  ABDOMEN: no hernias, masses, or  hepatosplenomegaly  GENITALIA: normal external genitalia, no erythema, no discharge  URETHRA: normal urethra, normal urethral meatus  VAGINA: mucosal atrophy  CERVIX: no lesions or cervical motion tenderness  UTERUS: normal  ADNEXA: normal adnexa and no mass, fullness, tenderness      ASSESSMENT:   well woman  1. Well woman exam with routine gynecological exam        PLAN:   mammogram  pap smear  return annually or prn  Orders Placed This Encounter    Mammo Digital Screening Bilat w/ Perez    Liquid-Based Pap Smear, Screening

## 2024-08-21 NOTE — TELEPHONE ENCOUNTER
No care due was identified.  [facility] Embedded Care Due Messages. Reference number: [id number].   8/21/2024 12:14:57 PM CDT

## 2024-08-22 RX ORDER — INSULIN PUMP SYRINGE, 3 ML
EACH MISCELLANEOUS
Qty: 1 EACH | Refills: 0 | Status: SHIPPED | OUTPATIENT
Start: 2024-08-22

## 2024-09-09 ENCOUNTER — PATIENT MESSAGE (OUTPATIENT)
Dept: OBSTETRICS AND GYNECOLOGY | Facility: CLINIC | Age: 64
End: 2024-09-09
Payer: COMMERCIAL

## 2024-09-12 ENCOUNTER — PATIENT MESSAGE (OUTPATIENT)
Dept: DIABETES | Facility: CLINIC | Age: 64
End: 2024-09-12
Payer: COMMERCIAL

## 2024-09-26 ENCOUNTER — PATIENT MESSAGE (OUTPATIENT)
Dept: FAMILY MEDICINE | Facility: CLINIC | Age: 64
End: 2024-09-26
Payer: COMMERCIAL

## 2024-10-28 ENCOUNTER — OFFICE VISIT (OUTPATIENT)
Dept: FAMILY MEDICINE | Facility: CLINIC | Age: 64
End: 2024-10-28
Payer: COMMERCIAL

## 2024-10-28 VITALS
OXYGEN SATURATION: 98 % | SYSTOLIC BLOOD PRESSURE: 129 MMHG | HEIGHT: 64 IN | HEART RATE: 75 BPM | TEMPERATURE: 98 F | WEIGHT: 169.88 LBS | BODY MASS INDEX: 29 KG/M2 | DIASTOLIC BLOOD PRESSURE: 76 MMHG

## 2024-10-28 DIAGNOSIS — Z23 NEED FOR INFLUENZA VACCINATION: ICD-10-CM

## 2024-10-28 DIAGNOSIS — E11.9 TYPE 2 DIABETES MELLITUS WITHOUT COMPLICATION, WITHOUT LONG-TERM CURRENT USE OF INSULIN: Primary | ICD-10-CM

## 2024-10-28 PROCEDURE — 3008F BODY MASS INDEX DOCD: CPT | Mod: CPTII,S$GLB,, | Performed by: FAMILY MEDICINE

## 2024-10-28 PROCEDURE — 90656 IIV3 VACC NO PRSV 0.5 ML IM: CPT | Mod: S$GLB,,, | Performed by: FAMILY MEDICINE

## 2024-10-28 PROCEDURE — 99213 OFFICE O/P EST LOW 20 MIN: CPT | Mod: 25,S$GLB,, | Performed by: FAMILY MEDICINE

## 2024-10-28 PROCEDURE — 90471 IMMUNIZATION ADMIN: CPT | Mod: S$GLB,,, | Performed by: FAMILY MEDICINE

## 2024-10-28 PROCEDURE — 1159F MED LIST DOCD IN RCRD: CPT | Mod: CPTII,S$GLB,, | Performed by: FAMILY MEDICINE

## 2024-10-28 PROCEDURE — 3044F HG A1C LEVEL LT 7.0%: CPT | Mod: CPTII,S$GLB,, | Performed by: FAMILY MEDICINE

## 2024-10-28 PROCEDURE — 3078F DIAST BP <80 MM HG: CPT | Mod: CPTII,S$GLB,, | Performed by: FAMILY MEDICINE

## 2024-10-28 PROCEDURE — 3074F SYST BP LT 130 MM HG: CPT | Mod: CPTII,S$GLB,, | Performed by: FAMILY MEDICINE

## 2024-10-28 PROCEDURE — 4010F ACE/ARB THERAPY RXD/TAKEN: CPT | Mod: CPTII,S$GLB,, | Performed by: FAMILY MEDICINE

## 2024-10-28 PROCEDURE — 1160F RVW MEDS BY RX/DR IN RCRD: CPT | Mod: CPTII,S$GLB,, | Performed by: FAMILY MEDICINE

## 2024-10-28 RX ORDER — IBUPROFEN 100 MG/5ML
30 SUSPENSION, ORAL (FINAL DOSE FORM) ORAL
COMMUNITY

## 2024-10-28 RX ORDER — SEMAGLUTIDE 1.34 MG/ML
1 INJECTION, SOLUTION SUBCUTANEOUS
Qty: 3 ML | Refills: 11 | Status: SHIPPED | OUTPATIENT
Start: 2024-10-28 | End: 2024-10-28 | Stop reason: SDUPTHER

## 2024-10-28 RX ORDER — SEMAGLUTIDE 1.34 MG/ML
1 INJECTION, SOLUTION SUBCUTANEOUS
Qty: 3 ML | Refills: 11 | Status: SHIPPED | OUTPATIENT
Start: 2024-10-28 | End: 2025-10-28

## 2024-10-30 DIAGNOSIS — E11.9 TYPE 2 DIABETES MELLITUS WITHOUT COMPLICATION: ICD-10-CM

## 2024-10-31 ENCOUNTER — PATIENT OUTREACH (OUTPATIENT)
Dept: ADMINISTRATIVE | Facility: HOSPITAL | Age: 64
End: 2024-10-31
Payer: COMMERCIAL

## 2024-11-03 NOTE — PROGRESS NOTES
" Patient ID: Gabby Dill is a 64 y.o. female.    Chief Complaint: Follow-up, Medication Problem, and Back Pain    HPI    Gabby Dill is a 64 y.o. female following up on type 2 diabetes which is under very good control.  Also has mild intermittent lower back pain.  No red flags.  No loss of strength or sensation.                Vitals:    10/28/24 1501   BP: 129/76  Comment: home reading   Patient Position: Sitting   Pulse: 75   Temp: 98.2 °F (36.8 °C)   TempSrc: Oral   SpO2: 98%   Weight: 77 kg (169 lb 13.8 oz)   Height: 5' 4" (1.626 m)            Review of Symptoms      Physical Exam    Constitutional:  Oriented to person, place, and time.appears well-developed and well-nourished.  No distress.      HENT  Head: Normocephalic and atraumatic  Right Ear: External ear normal.   Left Ear: External ear normal.   Nose: External nose normal.   Mouth:  Moist mucus membranes.    Eyes:  Conjunctivae are normal. Right eye exhibits no discharge.  Left eye exhibits no discharge. No scleral icterus.  No periorbital edema    Cardiovascular:  Regular rate and rhythm with normal S1 and S2     Pulmonary/Chest:   Clear to auscultation bilaterally without wheezes, rhonchi or rales      Musculoskeletal:  No edema. No obvious deformity No wasting       Neurological:  Alert and oriented to person, place, and time.   Coordination normal.     Skin:   Skin is warm and dry.  No diaphoresis.   No rash noted.     Psychiatric: Normal mood and affect. Behavior is normal.  Judgment and thought content normal.     Physical Exam              Complete Blood Count  Lab Results   Component Value Date    RBC 4.94 06/26/2024    HGB 14.4 06/26/2024    HCT 44.4 06/26/2024    MCV 89.9 06/26/2024    MCH 29.1 06/26/2024    MCHC 32.4 06/26/2024    RDW 13.3 06/26/2024     06/26/2024    MPV 9.5 06/26/2024    GRAN 6.8 10/01/2019    GRAN 86.8 (H) 10/01/2019    LYMPH 1,687 06/26/2024    LYMPH 35.9 06/26/2024    MONO 484 06/26/2024    MONO " "10.3 06/26/2024     06/26/2024    BASO 28 06/26/2024    EOSINOPHIL 2.5 06/26/2024    BASOPHIL 0.6 06/26/2024    DIFFMETHOD Automated 10/01/2019       Comprehensive Metabolic Panel  Lab Results   Component Value Date     (H) 06/26/2024    BUN 20 06/26/2024    CREATININE 0.54 06/26/2024     06/26/2024    K 4.2 06/26/2024     06/26/2024    PROT 6.9 06/26/2024    ALBUMIN 4.5 06/26/2024    BILITOT 0.5 06/26/2024    AST 17 06/26/2024    CO2 29 06/26/2024    ALT 21 06/26/2024       TSH  No results found for: "TSH"    Assessment / Plan:      ICD-10-CM ICD-9-CM   1. Type 2 diabetes mellitus without complication, without long-term current use of insulin  E11.9 250.00   2. Need for influenza vaccination  Z23 V04.81     Type 2 diabetes mellitus without complication, without long-term current use of insulin  -     semaglutide (OZEMPIC) 1 mg/dose (4 mg/3 mL); Inject 1 mg into the skin every 7 days.  Dispense: 3 mL; Refill: 11    Need for influenza vaccination  -     influenza (Flulaval, Fluzone, Fluarix) 45 mcg/0.5 mL IM vaccine (> or = 6 mo) 0.5 mL    Other orders  -     Discontinue: semaglutide (OZEMPIC) 1 mg/dose (4 mg/3 mL); Inject 1 mg into the skin every 7 days.  Dispense: 3 mL; Refill: 11                    "

## 2025-02-17 ENCOUNTER — PATIENT MESSAGE (OUTPATIENT)
Dept: FAMILY MEDICINE | Facility: CLINIC | Age: 65
End: 2025-02-17
Payer: COMMERCIAL

## 2025-02-18 ENCOUNTER — OFFICE VISIT (OUTPATIENT)
Dept: FAMILY MEDICINE | Facility: CLINIC | Age: 65
End: 2025-02-18
Payer: COMMERCIAL

## 2025-02-18 VITALS
SYSTOLIC BLOOD PRESSURE: 138 MMHG | OXYGEN SATURATION: 98 % | BODY MASS INDEX: 29.3 KG/M2 | HEIGHT: 64 IN | WEIGHT: 171.63 LBS | TEMPERATURE: 98 F | HEART RATE: 76 BPM | DIASTOLIC BLOOD PRESSURE: 70 MMHG

## 2025-02-18 DIAGNOSIS — F41.9 ANXIETY: ICD-10-CM

## 2025-02-18 DIAGNOSIS — B07.9 VERRUCOUS SKIN LESION: Primary | ICD-10-CM

## 2025-02-18 PROCEDURE — 99213 OFFICE O/P EST LOW 20 MIN: CPT | Mod: S$GLB,,, | Performed by: FAMILY MEDICINE

## 2025-02-18 RX ORDER — METFORMIN HYDROCHLORIDE 500 MG/1
500 TABLET, EXTENDED RELEASE ORAL
Qty: 90 TABLET | Refills: 3 | Status: SHIPPED | OUTPATIENT
Start: 2025-02-18 | End: 2026-02-18

## 2025-02-18 RX ORDER — HYDROCODONE BITARTRATE AND ACETAMINOPHEN 10; 325 MG/1; MG/1
1 TABLET ORAL
COMMUNITY

## 2025-02-18 RX ORDER — SUMATRIPTAN SUCCINATE 50 MG/1
TABLET ORAL
Qty: 9 TABLET | Refills: 1 | Status: SHIPPED | OUTPATIENT
Start: 2025-02-18

## 2025-02-18 RX ORDER — ALPRAZOLAM 0.5 MG/1
0.5 TABLET ORAL DAILY PRN
Qty: 30 TABLET | Refills: 1 | Status: SHIPPED | OUTPATIENT
Start: 2025-02-18

## 2025-02-18 RX ORDER — LISINOPRIL AND HYDROCHLOROTHIAZIDE 12.5; 2 MG/1; MG/1
2 TABLET ORAL DAILY
Qty: 180 TABLET | Refills: 3 | Status: SHIPPED | OUTPATIENT
Start: 2025-02-18

## 2025-02-18 NOTE — PROGRESS NOTES
" Patient ID: Gabby Dill is a 64 y.o. female.    Chief Complaint: Skin Tags (Around buttocks)    HPI    Gabby Dill is a 64 y.o. female here for removal of skin tag on inferior gluteus right side.  Aggravating her when she walks.  Going to [employer] and wants to have this removed.        Review of Symptoms      Physical Exam    Constitutional:  Oriented to person, place, and time.appears well-developed and well-nourished.  No distress.      HENT  Head: Normocephalic and atraumatic  Right Ear: External ear normal.   Left Ear: External ear normal.   Nose: External nose normal.   Mouth:  Moist mucus membranes.    Eyes:  Conjunctivae are normal. Right eye exhibits no discharge.  Left eye exhibits no discharge. No scleral icterus.  No periorbital edema        Musculoskeletal:  No edema. No obvious deformity No wasting       Neurological:  Alert and oriented to person, place, and time.   Coordination normal.     Skin:   5 millimeters x 1 millimeter linear wart.    Psychiatric: Normal mood and affect. Behavior is normal.  Judgment and thought content normal.     Physical Exam              Complete Blood Count  Lab Results   Component Value Date    RBC 4.94 06/26/2024    HGB 14.4 06/26/2024    HCT 44.4 06/26/2024    MCV 89.9 06/26/2024    MCH 29.1 06/26/2024    MCHC 32.4 06/26/2024    RDW 13.3 06/26/2024     06/26/2024    MPV 9.5 06/26/2024    GRAN 6.8 10/01/2019    GRAN 86.8 (H) 10/01/2019    LYMPH 1,687 06/26/2024    LYMPH 35.9 06/26/2024    MONO 484 06/26/2024    MONO 10.3 06/26/2024     06/26/2024    BASO 28 06/26/2024    EOSINOPHIL 2.5 06/26/2024    BASOPHIL 0.6 06/26/2024    DIFFMETHOD Automated 10/01/2019       Comprehensive Metabolic Panel  No results found for: "GLU", "BUN", "CREATININE", "NA", "K", "CL", "PROT", "ALBUMIN", "BILITOT", "AST", "ALKPHOS", "CO2", "ALT", "ANIONGAP", "EGFRNONAA", "ESTGFRAFRICA"    TSH  No results found for: "TSH"    Assessment / Plan:      ICD-10-CM " ICD-9-CM   1. Anxiety  F41.9 300.00     Anxiety  -     ALPRAZolam (XANAX) 0.5 MG tablet; Take 1 tablet (0.5 mg total) by mouth daily as needed.  Dispense: 30 tablet; Refill: 1    Other orders  -     lisinopriL-hydrochlorothiazide (PRINZIDE,ZESTORETIC) 20-12.5 mg per tablet; Take 2 tablets by mouth once daily.  Dispense: 180 tablet; Refill: 3  -     metFORMIN (GLUCOPHAGE-XR) 500 MG ER 24hr tablet; Take 1 tablet (500 mg total) by mouth daily with breakfast.  Dispense: 90 tablet; Refill: 3  -     sumatriptan (IMITREX) 50 MG tablet; One or two po daily for migraine headache  Dispense: 9 tablet; Refill: 1        Assessment & Plan          Skin tag  removed

## 2025-02-22 ENCOUNTER — PATIENT MESSAGE (OUTPATIENT)
Dept: FAMILY MEDICINE | Facility: CLINIC | Age: 65
End: 2025-02-22
Payer: COMMERCIAL

## 2025-02-26 ENCOUNTER — PATIENT MESSAGE (OUTPATIENT)
Dept: FAMILY MEDICINE | Facility: CLINIC | Age: 65
End: 2025-02-26
Payer: COMMERCIAL

## 2025-02-27 ENCOUNTER — OFFICE VISIT (OUTPATIENT)
Dept: FAMILY MEDICINE | Facility: CLINIC | Age: 65
End: 2025-02-27
Payer: COMMERCIAL

## 2025-02-27 VITALS
DIASTOLIC BLOOD PRESSURE: 82 MMHG | HEIGHT: 64 IN | HEART RATE: 84 BPM | WEIGHT: 170.63 LBS | OXYGEN SATURATION: 98 % | TEMPERATURE: 98 F | SYSTOLIC BLOOD PRESSURE: 124 MMHG | BODY MASS INDEX: 29.13 KG/M2

## 2025-02-27 DIAGNOSIS — L91.8 SKIN TAG: Primary | ICD-10-CM

## 2025-02-27 PROCEDURE — 99499 UNLISTED E&M SERVICE: CPT | Mod: S$GLB,,, | Performed by: FAMILY MEDICINE

## 2025-02-27 RX ORDER — SCOPOLAMINE 1 MG/3D
1 PATCH, EXTENDED RELEASE TRANSDERMAL
Qty: 4 PATCH | Refills: 1 | Status: SHIPPED | OUTPATIENT
Start: 2025-02-27

## 2025-02-27 RX ORDER — PREDNISONE 20 MG/1
TABLET ORAL
Qty: 5 TABLET | Refills: 0 | Status: SHIPPED | OUTPATIENT
Start: 2025-02-27

## 2025-02-27 NOTE — PROGRESS NOTES
Health maintenance    Eye: jannette signed   Pneumo: pt prefers to discuss with provider  Mammo: pt prefers to self schedule

## 2025-03-03 ENCOUNTER — PATIENT OUTREACH (OUTPATIENT)
Dept: ADMINISTRATIVE | Facility: HOSPITAL | Age: 65
End: 2025-03-03
Payer: COMMERCIAL

## 2025-03-03 NOTE — LETTER
AUTHORIZATION FOR RELEASE OF   CONFIDENTIAL INFORMATION    Dear Pascual/Tristin,    We are seeing Gabby Dill, date of birth 1960, in the clinic at [facility]. Harinder Galdamez MD is the patient's PCP. Gabby Dill has an outstanding lab/procedure at the time we reviewed her chart. In order to help keep her health information updated, she has authorized us to request the following medical record(s):                                            ( X )  EYE EXAM                 Please fax records to [facility], [place], Andrew J., [place], [phone]        Patient Name: Gabby Dill  : 1960  Patient Phone #: [phone]

## 2025-03-03 NOTE — PROGRESS NOTES
Population Health Chart Review & Patient Outreach Details      Additional [facility] Health Notes:               Updates Requested / Reviewed:      Updated Care Coordination Note, Care Everywhere, and Immunizations Reconciliation Completed or Queried: [place]         Health Maintenance Topics Overdue:      [facility] Score: 4     Urine Screening  Eye Exam  Hemoglobin A1c  Foot Exam    Pneumonia Vaccine                  Health Maintenance Topic(s) Outreach Outcomes & Actions Taken:    Eye Exam - Outreach Outcomes & Actions Taken  : External Records Requested & Care Team Updated if Applicable and Efax to Pascual/Tristin

## 2025-03-08 NOTE — PROGRESS NOTES
" Patient ID: Gabby Dill is a 64 y.o. female.    Chief Complaint: Wound Check    HPI    Gabby Dill is a 64 y.o. female     History of Present Illness              Vitals:    02/27/25 1015   BP: 124/82   BP Location: Right arm   Patient Position: Sitting   Pulse: 84   Temp: 98.3 °F (36.8 °C)   TempSrc: Oral   SpO2: 98%   Weight: 77.4 kg (170 lb 10.2 oz)   Height: 5' 4" (1.626 m)            Review of Symptoms      Physical Exam    Constitutional:  Oriented to person, place, and time.appears well-developed and well-nourished.  No distress.      HENT  Head: Normocephalic and atraumatic  Right Ear: External ear normal.   Left Ear: External ear normal.   Nose: External nose normal.   Mouth:  Moist mucus membranes.    Eyes:  Conjunctivae are normal. Right eye exhibits no discharge.  Left eye exhibits no discharge. No scleral icterus.  No periorbital edema    Cardiovascular:  Regular rate and rhythm with normal S1 and S2     Pulmonary/Chest:   Clear to auscultation bilaterally without wheezes, rhonchi or rales      Musculoskeletal:  No edema. No obvious deformity No wasting       Neurological:  Alert and oriented to person, place, and time.   Coordination normal.     Skin:   Here following up on removal of skin tag leg healing well.    Psychiatric: Normal mood and affect. Behavior is normal.  Judgment and thought content normal.     Physical Exam              Complete Blood Count  Lab Results   Component Value Date    RBC 4.94 06/26/2024    HGB 14.4 06/26/2024    HCT 44.4 06/26/2024    MCV 89.9 06/26/2024    MCH 29.1 06/26/2024    MCHC 32.4 06/26/2024    RDW 13.3 06/26/2024     06/26/2024    MPV 9.5 06/26/2024    GRAN 6.8 10/01/2019    GRAN 86.8 (H) 10/01/2019    LYMPH 1,687 06/26/2024    LYMPH 35.9 06/26/2024    MONO 484 06/26/2024    MONO 10.3 06/26/2024     06/26/2024    BASO 28 06/26/2024    EOSINOPHIL 2.5 06/26/2024    BASOPHIL 0.6 06/26/2024    DIFFMETHOD Automated 10/01/2019 " "      Comprehensive Metabolic Panel  No results found for: "GLU", "BUN", "CREATININE", "NA", "K", "CL", "PROT", "ALBUMIN", "BILITOT", "AST", "ALKPHOS", "CO2", "ALT", "ANIONGAP", "EGFRNONAA", "ESTGFRAFRICA"    TSH  No results found for: "TSH"    Assessment / Plan:    No diagnosis found.  There are no diagnoses linked to this encounter.    Assessment & Plan              This note was generated with the assistance of ambient listening technology. Verbal consent was obtained by the patient and accompanying visitor(s) for the recording of patient appointment to facilitate this note. I attest to having reviewed and edited the generated note for accuracy, though some syntax or spelling errors may persist. Please contact the author of this note for any clarification.    "

## 2025-03-11 ENCOUNTER — PATIENT MESSAGE (OUTPATIENT)
Dept: FAMILY MEDICINE | Facility: CLINIC | Age: 65
End: 2025-03-11
Payer: COMMERCIAL

## 2025-03-11 ENCOUNTER — PATIENT OUTREACH (OUTPATIENT)
Dept: ADMINISTRATIVE | Facility: HOSPITAL | Age: 65
End: 2025-03-11
Payer: COMMERCIAL

## 2025-03-11 NOTE — PROGRESS NOTES
Population Health Chart Review & Patient Outreach Details      Additional [facility] Health Notes:               Updates Requested / Reviewed:      Updated Care Coordination Note         Health Maintenance Topics Overdue:      [facility] Score: 4     Urine Screening  Eye Exam  Hemoglobin A1c  Foot Exam    Pneumonia Vaccine                  Health Maintenance Topic(s) Outreach Outcomes & Actions Taken:    Eye Exam - Outreach Outcomes & Actions Taken  : Non-Diabetic Eye External Records Uploaded, Care Team & History Updated if Applicable

## 2025-03-21 ENCOUNTER — PATIENT MESSAGE (OUTPATIENT)
Dept: FAMILY MEDICINE | Facility: CLINIC | Age: 65
End: 2025-03-21
Payer: COMMERCIAL

## 2025-03-21 DIAGNOSIS — Z00.00 ROUTINE HEALTH MAINTENANCE: ICD-10-CM

## 2025-03-21 DIAGNOSIS — E11.9 TYPE 2 DIABETES MELLITUS WITHOUT COMPLICATION, WITHOUT LONG-TERM CURRENT USE OF INSULIN: Primary | ICD-10-CM

## 2025-03-22 NOTE — TELEPHONE ENCOUNTER
Please fax labs printed externally to the vaccine below    Lab work at clinical pathology [facility] [address]   [phone]        Address  [facility] [address], [place] [address]  Phone  [phone]  Fax  [phone]  Hours  Mon-Fri: 7:30AM - 5:00PM  Closed 1:00PM - 2:00PM

## 2025-04-07 ENCOUNTER — TELEPHONE (OUTPATIENT)
Dept: NEUROSURGERY | Facility: CLINIC | Age: 65
End: 2025-04-07
Payer: COMMERCIAL

## 2025-04-07 NOTE — TELEPHONE ENCOUNTER
"----- Message from Angie sent at 4/7/2025 12:29 PM CDT -----  Regarding: pt advice  Contact: [phone]  .Name Of Caller: Self Contact Preference?: [phone] What is the nature of the call?: in reference to a potential surgery, pls call Additional Notes:"Thank you for all that you do for our patients"  "

## 2025-04-07 NOTE — TELEPHONE ENCOUNTER
S/w pt regarding call back. Pt stated that she was supposed to have sx 5 years ago but wasn't ready. She stated that her pain mgmt provider recommended her to see Dr. Nair or Dr. Landon. Pt would like to see both providers to get reassurance. I stated that she would need to see Dr. Landon's PA for workup prior to seeing Dr. Landon. Pt v/u and would like to proceed with scheduling. I offered her an appt with Margot Mena on 4/29 at 2PM. Advised her that we will need recent imaging and pt stated that she had a recent MRI and XR of her Lspine on a disc. Advised her to bring imaging so that it could be review during her visit. Pt v/u and is agreeable with the time and date for appt.

## 2025-04-29 ENCOUNTER — TELEPHONE (OUTPATIENT)
Dept: UROLOGY | Facility: CLINIC | Age: 65
End: 2025-04-29
Payer: COMMERCIAL

## 2025-04-29 ENCOUNTER — OFFICE VISIT (OUTPATIENT)
Dept: NEUROSURGERY | Facility: CLINIC | Age: 65
End: 2025-04-29
Payer: COMMERCIAL

## 2025-04-29 VITALS
HEIGHT: 64 IN | SYSTOLIC BLOOD PRESSURE: 147 MMHG | HEART RATE: 76 BPM | DIASTOLIC BLOOD PRESSURE: 75 MMHG | BODY MASS INDEX: 29.29 KG/M2 | OXYGEN SATURATION: 96 %

## 2025-04-29 DIAGNOSIS — M46.1 INFLAMMATION OF LEFT SACROILIAC JOINT: Primary | ICD-10-CM

## 2025-04-29 DIAGNOSIS — G89.29 CHRONIC LEFT-SIDED LOW BACK PAIN WITHOUT SCIATICA: ICD-10-CM

## 2025-04-29 DIAGNOSIS — M54.50 CHRONIC LEFT-SIDED LOW BACK PAIN WITHOUT SCIATICA: ICD-10-CM

## 2025-04-29 PROBLEM — M54.9 BACK PAIN: Status: ACTIVE | Noted: 2025-04-29

## 2025-04-29 PROBLEM — F41.9 ANXIETY DISORDER: Status: ACTIVE | Noted: 2025-04-29

## 2025-04-29 PROBLEM — K57.90 DIVERTICULOSIS: Status: ACTIVE | Noted: 2025-04-29

## 2025-04-29 PROCEDURE — 99204 OFFICE O/P NEW MOD 45 MIN: CPT | Mod: S$GLB,,, | Performed by: PHYSICIAN ASSISTANT

## 2025-04-29 NOTE — PROGRESS NOTES
[facility]  Neurosurgery    SUBJECTIVE:     History of Present Illness:  Gabby Dill is a 64 y.o. female with HTN, colitis, and DM who presents with chronic low back pain.  Symptoms have been ongoing for many years.  She was seen by Neurosurgery in 2020 and offered an L4-5 OLIF with Dr. Nair.  She declined at that time and continued treatment with pain management.  Right-sided low back pain has been resolved for some time, but left-sided low back pain has been constant since 2021.  Pain is generally 8 to 10/10.  She generally denies radicular leg pain, numbness, and weakness.  She will sometimes feel a bit of pain in her legs after a long day of heavy work, but otherwise left-sided low back pain is her chief complaint.  She denies gait disturbance and fine motor dysfunction.  The only other abnormality that she notes is when she stands still, she constantly feels like she is tipping forward.    She manages the symptoms with Norco, gabapentin, and muscle relaxers daily.  She can not take anti-inflammatories due to colitis.  She last tried physical therapy in 2023 for her lower back.      Treatments tried:  -PT:  2023 for her back  -JULIUS:  JULIUS and RFA in the past  -Gabapentin:  300 mg t.i.d.  -Muscle relaxer:  Robaxin nightly  -prescription pain medication: Norco  t.i.d.  -Spine surgery:  Never    (Not in a hospital admission)      Review of patient's allergies indicates:   Allergen Reactions    Morphine Hives    Naproxen      Other Reaction(s): stomach bleeding    Nsaids (non-steroidal anti-inflammatory drug) Other (See Comments)     GI bleed    Sulfa (sulfonamide antibiotics) Rash       Past Medical History:   Diagnosis Date    Arthritis     back and tendenosis of shoulder  CTS    Colitis 2011    Colitis     Hypertension      Past Surgical History:   Procedure Laterality Date    APPENDECTOMY  10/01/19    EPIDURAL STEROID INJECTION  06/2024    FACIAL FRACTURE SURGERY      LAPAROSCOPIC  "APPENDECTOMY N/A 10/01/2019    Procedure: APPENDECTOMY, LAPAROSCOPIC;  Surgeon: Marcus Ruby Jr., MD;  Location: [facility] OR;  Service: General;  Laterality: N/A;    PLANTAR FASCIA SURGERY Bilateral     right shoulder      right thyroid       Family History   Problem Relation Name Age of Onset    Hypertension Mother      Cancer Father HJ     Hypertension Father HJ     Arthritis Father HJ     Hearing loss Father HJ      Social History[1]     Review of Systems:  As noted in HPI    OBJECTIVE:     Vital Signs (Most Recent):  Vitals:    04/29/25 1406   BP: (!) 147/75   Pulse: 76   SpO2: 96%   Height: 5' 4" (1.626 m)   PainSc:   8   PainLoc: Back     Body mass index is 29.29 kg/m².      Physical Exam:  General: well developed, well nourished, no distress  Head: normocephalic, atraumatic  Neurologic: Alert and oriented. Thought content appropriate  GCS: Motor: 6/Verbal: 5/Eyes: 4 GCS Total: 15  Language: No aphasia  Speech: No dysarthria  Cranial nerves: face symmetric, tongue midline, CN II-XII grossly intact.   Eyes: pupils equal, round, reactive to light with accommodation, EOMI.   Pulmonary: normal respirations, not labored, no accessory muscles used  Sensory: intact to light touch throughout b/l lower extremity  Motor Strength: Moves all extremities spontaneously with good tone.  Full strength upper and lower extremities. No abnormal movements seen.     Strength  Deltoids Triceps Biceps   Hand    Upper: R 5/5 5/5 5/5   5/5    L 5/5 5/5 5/5   5/5     Iliopsoas Quadriceps  Tibialis  anterior Gastro- cnemius EHL   Lower: R 5/5 5/5  5/5 5/5 5/5    L 5/5 5/5  5/5 5/5 5/5     DTR's - 2 + and symmetric patellar  Basilio: absent  Clonus: absent    Gait: normal      SI Joint tenderness:  Positive on the left   AMANDA: Positive on left   Midline Bony Tenderness:  Negative    Diagnostic Results:  I have personally reviewed imaging and agree with the findings.     ** images were presented on an outside imaging disc.  " Patient declined leaving the disc for scanning due to upcoming appointments    MRI lumbar spine, 03/19/2025:  No severe stenosis at any level   Grade 1 anterolisthesis at L4-5.  Disc bulging at L4-5 causing b/l L5 nerve root encroachment but no compression and mild central stenosis    Upright lumbar spine xray with flex/ext views, 03/19/2025:  Grade 1 anterolisthesis at L4-5 with approximately 2.5 mm of motion between flexion-extension view  -this is slightly increased compared to 2020 imaging    ASSESSMENT/PLAN:     Gabby Dill is a 64 y.o. female who presents with grade 1 anterolisthesis at L4-5 with chronic left-sided low back pain.  Recent flexion-extension x-rays show approximately 2.5 mm of motion.  I explained to the patient that this is just below our threshold for instability.  Given the fact that she is intact on exam, I do not think surgical intervention is urgent.  Also, she had a positive AMANDA and positive SI tenderness on exam.  I believe she would benefit from a left-sided SI joint injection as well as physical therapy to address her back and SI joint.  If she fails both of these, she would be a candidate for an L4-5 TLIF.  She is hopeful to avoid surgery but also does not wish to live the rest of her life in his level of pain.    Recommend follow up in 3 months.  If pain persists, I will order a lumbar CT as well as scoliosis x-rays and have the patient see Dr. Starr for surgical planning.    Of note, she does plan to see Dr. Nair next week for a 2nd opinion.  She understands to bring her imaging discs with her to that appointment      Please feel free to call with any further questions        Margot Mena PA-C  [facility]  Department of Neurosurgery  [phone]    Disclaimer: This note was dictated by speech recognition. Minor errors in transcription may be present.  Please call with any questions.         [1]   Social History  Tobacco Use    Smoking status: Former      Current packs/day: 0.00     Types: Cigarettes     Quit date: 1995     Years since quittin.3     Passive exposure: Past    Smokeless tobacco: Never   Substance Use Topics    Alcohol use: Yes     Alcohol/week: 4.0 standard drinks of alcohol     Types: 2 Shots of liquor, 2 Drinks containing 0.5 oz of alcohol per week     Comment: occasionally    Drug use: No

## 2025-04-29 NOTE — PATIENT INSTRUCTIONS
Recommend PT for your low back and left SI joint.   Recommend a left SI joint injection    If these remedies fail, we will plan to complete a scoliosis xray and lumbar CT for surgical planning with Dr. Landon.

## 2025-05-02 ENCOUNTER — TELEPHONE (OUTPATIENT)
Dept: NEUROSURGERY | Facility: CLINIC | Age: 65
End: 2025-05-02
Payer: COMMERCIAL

## 2025-05-02 NOTE — TELEPHONE ENCOUNTER
Returned pt's call, pt stated that she did complete an MRI within the last 6 months but it was done at an outside facility. I asked pt if she can bring the disc in prior to her appointment on Tuesday due to the disc having to be uploaded into the system. Pt stated that she can't due to her going out of town on Monday. I stated to pt that we do not accept the disc the same day as the appointment. Pt stated that she will call back to rescheduled. Pt voiced understanding

## 2025-05-15 ENCOUNTER — PATIENT MESSAGE (OUTPATIENT)
Dept: FAMILY MEDICINE | Facility: CLINIC | Age: 65
End: 2025-05-15
Payer: COMMERCIAL

## 2025-05-26 ENCOUNTER — TELEPHONE (OUTPATIENT)
Dept: NEUROSURGERY | Facility: CLINIC | Age: 65
End: 2025-05-26
Payer: COMMERCIAL

## 2025-05-26 DIAGNOSIS — M54.50 CHRONIC LEFT-SIDED LOW BACK PAIN WITHOUT SCIATICA: Primary | ICD-10-CM

## 2025-05-26 DIAGNOSIS — G89.29 CHRONIC LEFT-SIDED LOW BACK PAIN WITHOUT SCIATICA: Primary | ICD-10-CM

## 2025-05-27 ENCOUNTER — OFFICE VISIT (OUTPATIENT)
Dept: NEUROSURGERY | Facility: CLINIC | Age: 65
End: 2025-05-27
Payer: COMMERCIAL

## 2025-05-27 VITALS
HEIGHT: 64 IN | HEART RATE: 75 BPM | DIASTOLIC BLOOD PRESSURE: 82 MMHG | WEIGHT: 170.63 LBS | SYSTOLIC BLOOD PRESSURE: 153 MMHG | BODY MASS INDEX: 29.13 KG/M2

## 2025-05-27 DIAGNOSIS — G89.29 CHRONIC LEFT-SIDED LOW BACK PAIN WITHOUT SCIATICA: ICD-10-CM

## 2025-05-27 DIAGNOSIS — M43.16 SPONDYLOLISTHESIS AT L4-L5 LEVEL: Primary | ICD-10-CM

## 2025-05-27 DIAGNOSIS — M53.2X6 LUMBAR SPINE INSTABILITY: ICD-10-CM

## 2025-05-27 DIAGNOSIS — M54.50 CHRONIC LEFT-SIDED LOW BACK PAIN WITHOUT SCIATICA: ICD-10-CM

## 2025-05-27 PROCEDURE — 99214 OFFICE O/P EST MOD 30 MIN: CPT | Mod: S$GLB,,, | Performed by: NEUROLOGICAL SURGERY

## 2025-05-27 PROCEDURE — 99999 PR PBB SHADOW E&M-EST. PATIENT-LVL III: CPT | Mod: PBBFAC,,, | Performed by: NEUROLOGICAL SURGERY

## 2025-05-27 NOTE — PROGRESS NOTES
NEUROSURGICAL PROGRESS NOTE    DATE OF SERVICE:  05/27/2025    ATTENDING PHYSICIAN:  Tashi Nair MD    SUBJECTIVE:    INTERIM HISTORY:    I saw her in 2020 for a L4-5 spondylolisthesis with right foraminal stenosis.  At this time she had mainly right leg pain.  She underwent spinal injections and a right leg pain went away.  At this time she is only complaining of left-sided low back pain.  She does not have leg pain.  The low back pain is affecting her quality of life and functional status.  Pain is worse with physical activity such as standing, walking, leaning forward.  She is currently taking care of a 3-year-old grandchild.  No new onset of motor weakness numbness or sphincter dysfunction symptoms.  She takes hydrocodone 10 mg 2 to 3 times a day.  She is still established with pain management.            PAST MEDICAL HISTORY:  Active Ambulatory Problems     Diagnosis Date Noted    Elevated triglycerides with high cholesterol 11/03/2019    Essential hypertension 11/03/2019    Reflux esophagitis 11/03/2019    Type 2 diabetes mellitus without complication, without long-term current use of insulin 06/29/2024    Anxiety disorder 04/29/2025    Diverticulosis 04/29/2025    Back pain 04/29/2025     Resolved Ambulatory Problems     Diagnosis Date Noted    Acute appendicitis with localized peritonitis, without perforation, abscess, or gangrene 09/30/2019     Past Medical History:   Diagnosis Date    Arthritis     Colitis 2011    Colitis     Hypertension        PAST SURGICAL HISTORY:  Past Surgical History:   Procedure Laterality Date    APPENDECTOMY  10/01/19    EPIDURAL STEROID INJECTION  06/2024    FACIAL FRACTURE SURGERY      LAPAROSCOPIC APPENDECTOMY N/A 10/01/2019    Procedure: APPENDECTOMY, LAPAROSCOPIC;  Surgeon: Marcus Ruby Jr., MD;  Location: [facility];  Service: General;  Laterality: N/A;    PLANTAR FASCIA SURGERY Bilateral     right shoulder      right thyroid         SOCIAL HISTORY:   Social  History[1]    FAMILY HISTORY:  Family History   Problem Relation Name Age of Onset    Hypertension Mother      Cancer Father HJ     Hypertension Father HJ     Arthritis Father HJ     Hearing loss Father HJ        CURRENTS MEDICATIONS:  Medications Ordered Prior to Encounter[2]    ALLERGIES:  Review of patient's allergies indicates:   Allergen Reactions    Morphine Hives    Naproxen      Other Reaction(s): stomach bleeding    Nsaids (non-steroidal anti-inflammatory drug) Other (See Comments)     GI bleed    Sulfa (sulfonamide antibiotics) Rash       REVIEW OF SYSTEMS:  Review of Systems   Constitutional:  Negative for diaphoresis, fever and weight loss.   Respiratory:  Negative for shortness of breath.    Cardiovascular:  Negative for chest pain.   Gastrointestinal:  Negative for blood in stool.   Genitourinary:  Negative for hematuria.   Endo/Heme/Allergies:  Does not bruise/bleed easily.   All other systems reviewed and are negative.        OBJECTIVE:    PHYSICAL EXAMINATION:   Vitals:    05/27/25 1103   BP: (!) 153/82   Pulse: 75       Physical Exam:  Vitals reviewed.    Constitutional: She appears well-developed and well-nourished.     Eyes: Pupils are equal, round, and reactive to light. Conjunctivae and EOM are normal.     Cardiovascular: Normal distal pulses and no edema.     Abdominal: Soft.     Skin: Skin displays no rash on trunk and no rash on extremities. Skin displays no lesions on trunk and no lesions on extremities.     Psych/Behavior: She is alert. She is oriented to person, place, and time. She has a normal mood and affect.     Musculoskeletal:        Neck: Range of motion is full.       Ortho Exam        Neurological Exam  Mental Status  Alert. Oriented to person, place, and time.    Cranial Nerves  CN III, IV, VI: Extraocular movements intact bilaterally. Pupils equal round and reactive to light bilaterally.    Motor   Strength is 5/5 throughout all four extremities.        DIAGNOSTIC DATA:  I  personally interpreted the following imaging:   Lumbar flexion-extension x-rays shows anterolisthesis at L4-5 measured at 10 mm in flexion, 5 mm in extension  Most recent lumbar spine MRI shows moderate spinal stenosis at L4-5, grade 1 spondylolisthesis    ASSESSMENT:  This is a 64 y.o. female with     Problem List Items Addressed This Visit          Orthopedic    Back pain     Other Visit Diagnoses         Spondylolisthesis at L4-L5 level    -  Primary      Lumbar spine instability                  PLAN:  I recommended left L3 and L4 medial branch block with possible radiofrequency ablation to treat L4-5 facet pain.  He failed conservative management would recommend a L4-5 oblique interbody fusion, placement of interbody spacer and posterior instrumentation, minimally invasive, to improve her chronic mechanical back pain.  I explained to her that a realistic goal would be to have about 50% pain relief with a back surgery.   All questions answered  The patient will follow up on the portal    More than 50% of the time was spent on discussing conservative management treatments (medication, physical therapy exercises) and possible interventions (spinal injections and surgical procedures). Care coordination was discussed.    30 min        Tashi Nair MD  Cell:[phone]           [1]   Social History  Socioeconomic History    Marital status:    Tobacco Use    Smoking status: Former     Current packs/day: 0.00     Types: Cigarettes     Quit date: 1995     Years since quittin.4     Passive exposure: Past    Smokeless tobacco: Never   Substance and Sexual Activity    Alcohol use: Yes     Alcohol/week: 4.0 standard drinks of alcohol     Types: 2 Shots of liquor, 2 Drinks containing 0.5 oz of alcohol per week     Comment: occasionally    Drug use: No    Sexual activity: Yes     Partners: Male     Birth control/protection: Post-menopausal     Social Drivers of Health     Financial Resource Strain: Patient  Declined (4/29/2025)    Overall Financial Resource Strain (CARDIA)     Difficulty of Paying Living Expenses: Patient declined   Food Insecurity: Patient Declined (4/29/2025)    Hunger Vital Sign     Worried About Running Out of Food in the Last Year: Patient declined     Ran Out of Food in the Last Year: Patient declined   Transportation Needs: No Transportation Needs (4/29/2025)    PRAPARE - Transportation     Lack of Transportation (Medical): No     Lack of Transportation (Non-Medical): No   Physical Activity: Inactive (4/29/2025)    Exercise Vital Sign     Days of Exercise per Week: 0 days     Minutes of Exercise per Session: 0 min   Stress: No Stress Concern Present (4/29/2025)    [employer] [place] [employer] - Occupational Stress Questionnaire     Feeling of Stress : Only a little   Housing Stability: Patient Declined (4/29/2025)    Housing Stability Vital Sign     Unable to Pay for Housing in the Last Year: Patient declined     Homeless in the Last Year: Patient declined   [2]   Current Outpatient Medications on File Prior to Visit   Medication Sig Dispense Refill    ALPRAZolam (XANAX) 0.5 MG tablet Take 1 tablet (0.5 mg total) by mouth daily as needed. 30 tablet 1    ascorbic acid, vitamin C, (VITAMIN C) 1000 MG tablet 30 tablets.      blood-glucose meter kit 1- Name of Meter: Insurance preferred meter. 2- Name of Test Strip: Insurance preferred test strips. 3- Frequency of Use: twice daily. 4- ICD 10 Code: E11.9. 5- Specific Quantity for 90 day supply: 90 day supply. 6- Refills: 3. 7- Lancets: 90 day supply 1 each 0    gabapentin (NEURONTIN) 300 MG capsule Take 300 mg by mouth 3 (three) times daily.      HYDROcodone-acetaminophen (NORCO)  mg per tablet Take 1 tablet by mouth.      LIALDA 1.2 gram TbEC Take 1.2 g by mouth daily with breakfast.       lisinopriL-hydrochlorothiazide (PRINZIDE,ZESTORETIC) 20-12.5 mg per tablet Take 2 tablets by mouth once daily. 180 tablet 3    metFORMIN  (GLUCOPHAGE-XR) 500 MG ER 24hr tablet Take 1 tablet (500 mg total) by mouth daily with breakfast. 90 tablet 3    methocarbamol (ROBAXIN) 750 MG Tab 750 mg 3 (three) times daily.       multivitamin (THERAGRAN) per tablet Take 1 tablet by mouth once daily.      omeprazole (PRILOSEC) 20 MG capsule Take 20 mg by mouth every morning.      scopolamine (TRANSDERM-SCOP) 1.3-1.5 mg (1 mg over 3 days) Place 1 patch onto the skin every 72 hours. 4 patch 1    semaglutide (OZEMPIC) 1 mg/dose (4 mg/3 mL) Inject 1 mg into the skin every 7 days. 3 mL 11    sumatriptan (IMITREX) 50 MG tablet One or two po daily for migraine headache 9 tablet 1    predniSONE (DELTASONE) 20 MG tablet One tablet daily by mouth for five days 5 tablet 0     No current facility-administered medications on file prior to visit.

## 2025-05-27 NOTE — PROGRESS NOTES
Oswestry Low Back Pain Disability Questionnaire    DATE OF SERVICE:  05/27/2025    ATTENDING PHYSICIAN:  Tashi Nair MD    Instructions    This questionnaire has been designed to give us information as to how your back or leg pain is affecting your ability to manage in everyday life. Please answer by checking ONE box in each section for the statement which best applies to you. We realize you may consider that two or more statements in any one section apply but please just shade out the spot that indicates the statement which most clearly describes your problem.    Section 1 - Pain intensity    * I have no pain at the moment.  * The pain is very mild at the moment.  * The pain is moderate at the moment.  * The pain is fairly severe at the moment.  * The pain is very severe at the moment.  * The pain is the worst imaginable at the moment.    Score : 3    Section 2 - Personal care (washing, dressing etc)    * I can look after myself normally without causing extra pain.  * I can look after myself normally but it causes extra pain.  * It is painful to look after myself and I am slow and careful.  * I need some help but manage most of my personal care.  * I need help every day in most aspects of self-care.  * I do not get dressed, I wash with difficulty and stay in bed.    Score : 0    Section 3 - Lifting    * I can lift heavy weights without extra pain.  * I can lift heavy weights but it gives extra pain.  * Pain prevents me from lifting heavy weights off the floor, but I can manage if they are conveniently placed eg. on a table.  * Pain prevents me from lifting heavy weights, but I can manage light to medium weights if they are conveniently positioned.  * I can lift very light weights.  * I cannot lift or carry anything at all.    Score : 3    Section 4 - Walking    * Pain does not prevent me walking any distance.  * Pain prevents me from walking more than 1 mile.         * Pain prevents me from walking more than  1/2 mile.         * Pain prevents me from walking more than 100 yards.            * I can only walk using a stick or crutches.  * I am in bed most of the time.    Score : 2    Section 5 - Sitting    * I can sit in any chair as long as I like.  * I can only sit in my favourite chair as long as I like.  * Pain prevents me sitting more than one hour.  * Pain prevents me from sitting more than 30 minutes.  * Pain prevents me from sitting more than 10 minutes.  * Pain prevents me from sitting at all.    Score : 3    Section 6 - Standing    * I can stand as long as I want without extra pain.  * I can stand as long as I want but it gives me extra pain.  * Pain prevents me from standing for more than 1 hour  * Pain prevents me from standing for more than 30 minutes.  * Pain prevents me from standing for more than 10 minutes.  * Pain prevents me from standing at all.     Score : 4    Section 7 - Sleeping    * My sleep is never disturbed by pain.  * My sleep is occasionally disturbed by pain.  * Because of pain I have less than 6 hours sleep.   * Because of pain I have less than 4 hours sleep.   * Because of pain I have less than 2 hours sleep.  * Pain prevents me from sleeping at all.    Score : 1    Section 8 - Sex life (if applicable)    * My sex life is normal and causes no extra pain.  * My sex life is normal but causes some extra pain.  * My sex life is nearly normal but is very painful.   * My sex life is severely restricted by pain.  * My sex life is nearly absent because of pain.   * Pain prevents any sex life at all.    Score : 2    Section 9 - Social life    * My social life is normal and gives me no extra pain.  * My social life is normal but increases the degree of pain.  * Pain has no significant effect on my social life apart from limiting my more energetic interests eg, sport.  * Pain has restricted my social life and I do not go out as often.  * Pain has restricted my social life to my home.   * I have no  social life because of pain.     Score : 3    Section 10 - Travelling    * I can travel anywhere without pain.  * I can travel anywhere but it gives me extra pain.  * Pain is bad but I manage journeys over two hours.  * Pain restricts me to journeys of less than one hour.  * Pain restricts me to short necessary journeys under 30 minutes.  * Pain prevents me from travelling except to receive treatment.    Score : 2      TOTAL SCORE :  23 / 50 x 2 = 46 %      References  1. Lannon BELIA, Odilon PB. The Oswestry Disability Index. Spine 2000 Nov 15;25(22):2949-52; discussion 52.  from standing for more than from standing for more than from standing for more than from standing at all

## 2025-06-09 ENCOUNTER — PATIENT MESSAGE (OUTPATIENT)
Dept: ADMINISTRATIVE | Facility: HOSPITAL | Age: 65
End: 2025-06-09
Payer: COMMERCIAL

## 2025-06-16 ENCOUNTER — PATIENT MESSAGE (OUTPATIENT)
Dept: FAMILY MEDICINE | Facility: CLINIC | Age: 65
End: 2025-06-16
Payer: COMMERCIAL

## 2025-06-17 LAB
LEFT EYE DM RETINOPATHY: NEGATIVE
RIGHT EYE DM RETINOPATHY: NEGATIVE

## 2025-06-18 LAB
CHOLEST SERPL-MCNC: 134 MG/DL
CHOLEST SERPL-MCNC: 225 MG/DL
HBA1C MFR BLD: 5.7 %
HDLC SERPL-MCNC: 68 MG/DL
LDL/HDL RATIO: 1.9
LDLC SERPL CALC-MCNC: 132 MG/DL

## 2025-06-21 ENCOUNTER — PATIENT MESSAGE (OUTPATIENT)
Dept: FAMILY MEDICINE | Facility: CLINIC | Age: 65
End: 2025-06-21
Payer: COMMERCIAL

## 2025-06-23 ENCOUNTER — OFFICE VISIT (OUTPATIENT)
Dept: FAMILY MEDICINE | Facility: CLINIC | Age: 65
End: 2025-06-23
Payer: COMMERCIAL

## 2025-06-23 ENCOUNTER — PATIENT OUTREACH (OUTPATIENT)
Dept: ADMINISTRATIVE | Facility: HOSPITAL | Age: 65
End: 2025-06-23
Payer: COMMERCIAL

## 2025-06-23 ENCOUNTER — TELEPHONE (OUTPATIENT)
Dept: FAMILY MEDICINE | Facility: CLINIC | Age: 65
End: 2025-06-23

## 2025-06-23 VITALS
HEART RATE: 88 BPM | OXYGEN SATURATION: 98 % | HEIGHT: 64 IN | WEIGHT: 170.94 LBS | DIASTOLIC BLOOD PRESSURE: 70 MMHG | BODY MASS INDEX: 29.18 KG/M2 | TEMPERATURE: 98 F | SYSTOLIC BLOOD PRESSURE: 108 MMHG

## 2025-06-23 DIAGNOSIS — Z12.39 ENCOUNTER FOR SCREENING FOR MALIGNANT NEOPLASM OF BREAST, UNSPECIFIED SCREENING MODALITY: ICD-10-CM

## 2025-06-23 DIAGNOSIS — Z00.00 ROUTINE HEALTH MAINTENANCE: Primary | ICD-10-CM

## 2025-06-23 DIAGNOSIS — E11.9 TYPE 2 DIABETES MELLITUS WITHOUT COMPLICATION, WITHOUT LONG-TERM CURRENT USE OF INSULIN: ICD-10-CM

## 2025-06-23 DIAGNOSIS — R73.9 HYPERGLYCEMIA: ICD-10-CM

## 2025-06-23 DIAGNOSIS — F41.9 ANXIETY: ICD-10-CM

## 2025-06-23 DIAGNOSIS — Z12.31 ENCOUNTER FOR SCREENING MAMMOGRAM FOR BREAST CANCER: ICD-10-CM

## 2025-06-23 PROCEDURE — 99396 PREV VISIT EST AGE 40-64: CPT | Mod: S$GLB,,, | Performed by: FAMILY MEDICINE

## 2025-06-23 RX ORDER — LISINOPRIL AND HYDROCHLOROTHIAZIDE 12.5; 2 MG/1; MG/1
2 TABLET ORAL DAILY
Qty: 180 TABLET | Refills: 3 | Status: SHIPPED | OUTPATIENT
Start: 2025-06-23 | End: 2025-07-02

## 2025-06-23 RX ORDER — SEMAGLUTIDE 2.68 MG/ML
2 INJECTION, SOLUTION SUBCUTANEOUS
Qty: 9 ML | Refills: 3 | Status: SHIPPED | OUTPATIENT
Start: 2025-06-23 | End: 2026-06-23

## 2025-06-23 RX ORDER — ROSUVASTATIN CALCIUM 10 MG/1
10 TABLET, COATED ORAL DAILY
Qty: 90 TABLET | Refills: 3 | Status: SHIPPED | OUTPATIENT
Start: 2025-06-23 | End: 2026-06-23

## 2025-06-23 RX ORDER — ALPRAZOLAM 0.5 MG/1
0.5 TABLET ORAL DAILY PRN
Qty: 30 TABLET | Refills: 1 | Status: SHIPPED | OUTPATIENT
Start: 2025-06-23

## 2025-06-23 RX ORDER — METFORMIN HYDROCHLORIDE 500 MG/1
500 TABLET, EXTENDED RELEASE ORAL
Qty: 90 TABLET | Refills: 3 | Status: SHIPPED | OUTPATIENT
Start: 2025-06-23 | End: 2026-06-23

## 2025-06-23 RX ORDER — METFORMIN HYDROCHLORIDE 500 MG/1
500 TABLET, EXTENDED RELEASE ORAL
Qty: 90 TABLET | Refills: 3 | Status: CANCELLED | OUTPATIENT
Start: 2025-06-23 | End: 2026-06-23

## 2025-06-23 NOTE — PROGRESS NOTES
CHANTEL signed for eye exam with Dr. Eduardo Lott, [facility]. Orders placed for mammo, pt declines all vaccines, pt states Dr. Ji can do foot exam

## 2025-06-23 NOTE — TELEPHONE ENCOUNTER
Care Due:                  Date            Visit Type   Department     Provider  --------------------------------------------------------------------------------                                EP -                              PRIMARY      [facility]  Last Visit: 06-      CARE ([facility])   MEDICINE       Harinder Galdamez  Next Visit: None Scheduled  None         None Found                                                            Last  Test          Frequency    Reason                     Performed    Due Date  --------------------------------------------------------------------------------    CMP.........  12 months..  lisinopriL-hydrochlorothi  06- 06-                             azide, metFORMIN.........    [employer] Embedded Care Due Messages. Reference number: [id number].   6/23/2025 2:27:58 PM CDT

## 2025-06-23 NOTE — PROGRESS NOTES
Population Health Chart Review & Patient Outreach Details      Additional Pop Health Notes:               Updates Requested / Reviewed:      Updated Care Coordination Note and          Health Maintenance Topics Overdue:      VBHM Score: 3     Urine Screening  Eye Exam  Foot Exam  Uncontrolled BP    Pneumonia Vaccine                  Health Maintenance Topic(s) Outreach Outcomes & Actions Taken:    Lab(s) - Outreach Outcomes & Actions Taken  : External Records Uploaded & Care Team Updated if Applicable

## 2025-06-24 ENCOUNTER — PATIENT OUTREACH (OUTPATIENT)
Dept: ADMINISTRATIVE | Facility: HOSPITAL | Age: 65
End: 2025-06-24
Payer: COMMERCIAL

## 2025-06-24 NOTE — LETTER
AUTHORIZATION FOR RELEASE OF   CONFIDENTIAL INFORMATION    Dear Dr. Lott,    We are seeing Gabby Dill, date of birth 1960, in the clinic at [facility]. Harinder Galdamez MD is the patient's PCP. Gabby Dill has an outstanding lab/procedure at the time we reviewed her chart. In order to help keep her health information updated, she has authorized us to request the following medical record(s):                                                ( X )  EYE EXAM              Please fax records to [facility], [place], Andrew J., [place], [phone]           Patient Name: Gabby Dill  : 1960  Patient Phone #: [phone]

## 2025-06-24 NOTE — PROGRESS NOTES
Population Health Chart Review & Patient Outreach Details      Additional Pop Health Notes:               Updates Requested / Reviewed:      Updated Care Coordination Note         Health Maintenance Topics Overdue:      VB Score: 3     Urine Screening  Eye Exam  Foot Exam    Pneumonia Vaccine                  Health Maintenance Topic(s) Outreach Outcomes & Actions Taken:    Eye Exam - Outreach Outcomes & Actions Taken  : External Records Requested & Care Team Updated if Applicable and [place] to Dr. Lott

## 2025-06-29 NOTE — PROGRESS NOTES
" Patient ID: Gabby Dill is a 65 y.o. female.    Chief Complaint: Follow-up    HPI      Gabby Dill is a 65 y.o. female. here for annual exam.   No current complaints.    History of Present Illness    CHIEF COMPLAINT:  Patient presents today for follow up    DIABETES AND METABOLIC HEALTH:  She reports good response to Ozempic with approximately 8-9 lbs of weight loss. She currently takes Ozempic and metformin without side effects and expresses interest in potentially increasing Ozempic dosage from 1 mg to 2 mg. Total cholesterol is 225 with HDL of 103, which is considered protective.    BACK PAIN:  MRI of spine showed significant shift since 2021. She has had surgical consultations with Dr. Barajas and Dr. Landon, who both determined back surgery is not urgent at this time. One consultant suggested potential 50% improvement with surgery, though she appears hesitant about surgical intervention. She continues with conservative management through injections.    CURRENT MEDICATIONS:  She continues Lisinopril long-term, eye vitamins (A reds) as recommended by eye doctor, Ozempic, and metformin.         Review of Symptoms    Constitutional: Negative.    HENT: Negative.    Eyes: Negative.    Respiratory: Negative.    Cardiovascular: Negative.    Gastrointestinal: Negative.    Endocrine: Negative.    Genitourinary: Negative.    Musculoskeletal: Negative.    Skin: Negative.    Allergic/Immunologic: Negative.    Neurological: Negative.    Hematological: Negative.    Psychiatric/Behavioral: Negative.      Except as above in HPI      Vitals:    06/23/25 1432   BP: 108/70   BP Location: Right arm   Patient Position: Sitting   Pulse: 88   Temp: 98.1 °F (36.7 °C)   TempSrc: Oral   SpO2: 98%   Weight: 77.6 kg (170 lb 15.5 oz)   Height: 5' 4" (1.626 m)        Physical  Exam      Constitutional:  Oriented to person, place, and time. Appears well-developed and well-nourished.     HENT:   Head: Normocephalic and " "atraumatic.     Right Ear: Tympanic membrane, ear canal and External ear normal     Left Ear: Tympanic membrane, ear canal and External ear normal     Nose: Nose normal. No rhinorrhea or nasal deformity.     Mouth/Throat: Uvula is midline, oropharynx is clear and moist and mucous membranes are normal.      Eyes: Conjunctivae are normal. Right eye exhibits no discharge. Left eye exhibits no discharge. No scleral icterus.     Neck:  No JVD present. No tracheal deviation  []  Neck supple.   []  No Carotid bruit    Cardiovascular:  Regular rate and rhythm with normal S1 and S2     Pulmonary/Chest:   Clear to auscultation bilaterally without wheezes, rhonchi or rales    Musculoskeletal: Normal range of motion. No edema or tenderness.   No deformity     Lymphadenopathy:  No cervical adenopathy.     Neurological:  Alert and oriented to person, place, and time. Coordination normal.     Skin: Skin is warm and dry. No rash noted.     Psychiatric: Normal mood and affect. Speech is normal and behavior is normal. Judgment and thought content normal.     Physical Exam              Complete Blood Count  Lab Results   Component Value Date    RBC 4.94 06/26/2024    HGB 14.4 06/26/2024    HCT 44.4 06/26/2024    MCV 89.9 06/26/2024    MCH 29.1 06/26/2024    MCHC 32.4 06/26/2024    RDW 13.3 06/26/2024     06/26/2024    MPV 9.5 06/26/2024    GRAN 6.8 10/01/2019    GRAN 86.8 (H) 10/01/2019    LYMPH 1,687 06/26/2024    LYMPH 35.9 06/26/2024    MONO 484 06/26/2024    MONO 10.3 06/26/2024     06/26/2024    BASO 28 06/26/2024    EOSINOPHIL 2.5 06/26/2024    BASOPHIL 0.6 06/26/2024    DIFFMETHOD Automated 10/01/2019       Comprehensive Metabolic Panel  No results found for: "GLU", "BUN", "CREATININE", "NA", "K", "CL", "PROT", "ALBUMIN", "BILITOT", "AST", "ALKPHOS", "CO2", "ALT", "ANIONGAP", "EGFRNONAA", "ESTGFRAFRICA"    TSH  No results found for: "TSH"    Assessment / Plan:      ICD-10-CM ICD-9-CM   1. Routine health " maintenance  Z00.00 V70.0   2. Encounter for screening for malignant neoplasm of breast, unspecified screening modality  Z12.39 V76.10   3. Type 2 diabetes mellitus without complication, without long-term current use of insulin  E11.9 250.00   4. Encounter for screening mammogram for breast cancer  Z12.31 V76.12   5. Hyperglycemia  R73.9 790.29     Routine health maintenance    Encounter for screening for malignant neoplasm of breast, unspecified screening modality  -     Mammo Digital Screening Bilat w/ Perez (XPD); Future; Expected date: 12/23/2025    Type 2 diabetes mellitus without complication, without long-term current use of insulin  -     Hemoglobin A1C; Future; Expected date: 06/23/2025  -     Cancel: Lipid Panel; Future; Expected date: 06/23/2025  -     Cancel: Comprehensive Metabolic Panel; Future; Expected date: 06/23/2025  -     Lipid Panel; Future; Expected date: 06/23/2025  -     Comprehensive Metabolic Panel; Future; Expected date: 06/23/2025    Encounter for screening mammogram for breast cancer  -     Mammo Digital Screening Bilat w/ Perez (XPD); Future; Expected date: 06/23/2025    Hyperglycemia  -     Hemoglobin A1C; Future; Expected date: 06/23/2025    Other orders  -     rosuvastatin (CRESTOR) 10 MG tablet; Take 1 tablet (10 mg total) by mouth once daily.  Dispense: 90 tablet; Refill: 3  -     metFORMIN (GLUCOPHAGE-XR) 500 MG ER 24hr tablet; Take 1 tablet (500 mg total) by mouth daily with breakfast.  Dispense: 90 tablet; Refill: 3  -     semaglutide (OZEMPIC) 2 mg/dose (8 mg/3 mL) PnIj; Inject 2 mg into the skin every 7 days.  Dispense: 9 mL; Refill: 3  -     lisinopriL-hydrochlorothiazide (PRINZIDE,ZESTORETIC) 20-12.5 mg per tablet; Take 2 tablets by mouth once daily.  Dispense: 180 tablet; Refill: 3        Assessment & Plan    - Assessed cholesterol levels: total cholesterol 225, high protective HDL (103), LDL above target for diabetic patients.  - Initiated statin therapy with Crestor 10 mg  daily to lower LDL, aiming for target below 80 over next couple of years.  - Evaluated risks and benefits of statin therapy, considering diabetes and cardiovascular risk factors.  - Increased Ozempic from 1 mg to 2 mg weekly injection based on good tolerability and modest weight loss, with instructions to self-adjust dose down if side effects occur.  - Performed annual diabetic foot exam, finding no signs of neuropathy or concerning lesions.    ## TYPE 2 DIABETES MELLITUS:  - Patient reports feeling good with 8-9 lbs of weight loss.  - Glucose levels are well-controlled on current regimen.  - Increasing Ozempic from 1 mg to 2 mg weekly injection based on good tolerability and modest weight loss; patient instructed to self-adjust dose down if side effects occur.  - Patient prefers to remain on Ozempic rather than switching to Mounjaro due to absence of side effects.  - Continuing Metformin as requested.  - Educated patient on the importance of daily foot checks to prevent complications from unnoticed injuries.  - Clarified that statins do not cause diabetes in patients who already have the condition.    ## HYPERLIPIDEMIA:  - Current lipid profile shows total cholesterol of 225 with HDL of 103, which is protective.  - However, LDL requires improvement to below 80 due to diabetes and associated increased risk of heart disease and stroke.  - Initiating Crestor 10 mg daily to lower LDL, with goal to reach target over next couple of years.  - Discussed potential side effects of statins, particularly muscle aches, and how to differentiate from usual aches.  - Recommend CoQ10 supplementation to help mitigate statin side effects.  - Patient instructed to contact office if experiencing concerning side effects.  - Will recheck cholesterol in 6 months.    ## INTERVERTEBRAL DISC DISORDERS WITH RADICULOPATHY:  - MRI shows spine has shifted since 2021.  - After reviewing Dr. Barajas's notes, discussed that surgery may help 50% of  current condition but is not urgently indicated.  - Recommend continuing with current treatment plan of injections and nerve blocks.    ## ESSENTIAL HYPERTENSION:  - Continuing Lisinopril as lifelong medication.    ## FOLLOW-UP AND ADDITIONAL ORDERS:  - Ordered lab work to be completed in 6 months, prior to next annual visit.  - Ordered mammogram.  - Follow up in 1 year.         Discussed how to stay healthy including: diet, and exercise.    This note was generated with the assistance of ambient listening technology. Verbal consent was obtained by the patient and accompanying visitor(s) for the recording of patient appointment to facilitate this note. I attest to having reviewed and edited the generated note for accuracy, though some syntax or spelling errors may persist. Please contact the author of this note for any clarification.

## 2025-07-02 RX ORDER — LISINOPRIL AND HYDROCHLOROTHIAZIDE 12.5; 2 MG/1; MG/1
2 TABLET ORAL
Qty: 180 TABLET | Refills: 0 | Status: SHIPPED | OUTPATIENT
Start: 2025-07-02

## 2025-07-02 NOTE — TELEPHONE ENCOUNTER
Refill Routing Note   Medication(s) are not appropriate for processing by [facility] for the following reason(s):        Required labs outdated    [facility] action(s):  Defer   Requires labs : Yes             Appointments  past 12m or future 3m with PCP    Date Provider   Last Visit   6/23/2025 Harinder Galdamez MD   Next Visit   Visit date not found Harinder Galdamez MD   ED visits in past 90 days: 0        Note composed:6:13 AM 07/02/2025

## 2025-07-02 NOTE — TELEPHONE ENCOUNTER
Care Due:                  Date            Visit Type   Department     Provider  --------------------------------------------------------------------------------                                EP -                              PRIMARY      [facility] FAMILY  Last Visit: 06-      CARE ([facility])   MEDICINE       Harinder Galdamez                              EP -                              PRIMARY      [facility] FAM[facility]  Next Visit: 07-      CARE ([facility])   MEDICINE       Harinder Galdamez                                                            Last  Test          Frequency    Reason                     Performed    Due Date  --------------------------------------------------------------------------------    Lipid Panel.  12 months..  rosuvastatin.............  06- 07-    [facility] Embedded Care Due Messages. Reference number: [id number].   7/02/2025 1:04:04 AM CDT

## 2025-07-07 ENCOUNTER — PATIENT OUTREACH (OUTPATIENT)
Dept: ADMINISTRATIVE | Facility: HOSPITAL | Age: 65
End: 2025-07-07
Payer: COMMERCIAL

## 2025-07-07 NOTE — PROGRESS NOTES
Population Health Chart Review & Patient Outreach Details      Additional Pop Health Notes:               Updates Requested / Reviewed:      Updated Care Coordination Note         Health Maintenance Topics Overdue:      [facility] Score: 2     Urine Screening  Foot Exam    Pneumonia Vaccine                  Health Maintenance Topic(s) Outreach Outcomes & Actions Taken:    Eye Exam - Outreach Outcomes & Actions Taken  : Diabetic Eye External Records Uploaded, Care Team & History Updated if Applicable

## 2025-07-31 ENCOUNTER — HOSPITAL ENCOUNTER (OUTPATIENT)
Dept: RADIOLOGY | Facility: HOSPITAL | Age: 65
Discharge: HOME | End: 2025-07-31
Attending: OTOLARYNGOLOGY
Payer: COMMERCIAL

## 2025-07-31 DIAGNOSIS — C73 THYROID CANCER: ICD-10-CM

## 2025-07-31 PROCEDURE — 76536 US EXAM OF HEAD AND NECK: CPT | Mod: 26,,, | Performed by: RADIOLOGY

## 2025-07-31 PROCEDURE — 76536 US EXAM OF HEAD AND NECK: CPT | Mod: TC,PN

## 2025-08-05 ENCOUNTER — PATIENT MESSAGE (OUTPATIENT)
Dept: FAMILY MEDICINE | Facility: CLINIC | Age: 65
End: 2025-08-05
Payer: COMMERCIAL

## 2025-08-14 ENCOUNTER — OFFICE VISIT (OUTPATIENT)
Dept: FAMILY MEDICINE | Facility: CLINIC | Age: 65
End: 2025-08-14
Payer: COMMERCIAL

## 2025-08-14 VITALS
DIASTOLIC BLOOD PRESSURE: 72 MMHG | OXYGEN SATURATION: 97 % | BODY MASS INDEX: 30.06 KG/M2 | HEIGHT: 64 IN | HEART RATE: 80 BPM | WEIGHT: 176.06 LBS | SYSTOLIC BLOOD PRESSURE: 120 MMHG | TEMPERATURE: 98 F

## 2025-08-14 DIAGNOSIS — Z23 NEED FOR PNEUMOCOCCAL 20-VALENT CONJUGATE VACCINATION: ICD-10-CM

## 2025-08-14 DIAGNOSIS — I10 ESSENTIAL HYPERTENSION: Primary | ICD-10-CM

## 2025-08-14 DIAGNOSIS — Z12.31 ENCOUNTER FOR SCREENING MAMMOGRAM FOR BREAST CANCER: ICD-10-CM

## 2025-08-14 DIAGNOSIS — Z86.39 H/O THYROID NODULE: ICD-10-CM

## 2025-08-14 PROCEDURE — 99213 OFFICE O/P EST LOW 20 MIN: CPT | Mod: S$GLB,,, | Performed by: FAMILY MEDICINE

## 2025-08-14 PROCEDURE — G2211 COMPLEX E/M VISIT ADD ON: HCPCS | Mod: S$GLB,,, | Performed by: FAMILY MEDICINE

## 2025-08-14 RX ORDER — PHENAZOPYRIDINE HYDROCHLORIDE 200 MG/1
200 TABLET, FILM COATED ORAL 3 TIMES DAILY PRN
Qty: 15 TABLET | Refills: 1 | Status: SHIPPED | OUTPATIENT
Start: 2025-08-14

## 2025-08-14 RX ORDER — UBIDECARENONE 100 MG
100 CAPSULE ORAL DAILY
COMMUNITY

## 2025-08-14 RX ORDER — NITROFURANTOIN 25; 75 MG/1; MG/1
100 CAPSULE ORAL 2 TIMES DAILY
Qty: 10 CAPSULE | Refills: 0 | Status: SHIPPED | OUTPATIENT
Start: 2025-08-14

## (undated) DEVICE — NDL INSUF ULTRA VERESS 120MM

## (undated) DEVICE — TROCAR ENDOPATH XCEL 5MM 7.5CM

## (undated) DEVICE — MANIFOLD 4 PORT

## (undated) DEVICE — ELECTRODE REM PLYHSV RETURN 9

## (undated) DEVICE — SEE MEDLINE ITEM 156952

## (undated) DEVICE — BAG TISSUE RETRIEVAL 225ML

## (undated) DEVICE — NDL 22GA X1 1/2 REG BEVEL

## (undated) DEVICE — CART STAPLE FLEX ETX 3.5MM BLU

## (undated) DEVICE — SUT VICRYL 3-0 27 SH

## (undated) DEVICE — IRRIGATOR ENDOSCOPY DISP.

## (undated) DEVICE — SUT MONOCRYL 4-0 PS-2

## (undated) DEVICE — SUT 0 VICRYL / UR6 (J603)

## (undated) DEVICE — SUPPORT ULNA NERVE PROTECTOR

## (undated) DEVICE — ADHESIVE DERMABOND ADVANCED

## (undated) DEVICE — STAPLER INT LINEAR ARTC 3.5-45

## (undated) DEVICE — SUT VICRYL PLUS 4-0 PS2 27

## (undated) DEVICE — GLOVE PROTEXIS HYDROGEL SZ7

## (undated) DEVICE — SUT VCRL ENDOLOOP 0 18 VIOL

## (undated) DEVICE — TRAY FOLEY 16FR INFECTION CONT

## (undated) DEVICE — CART STAPLE RELD 45MM WHT